# Patient Record
Sex: FEMALE | Race: WHITE | NOT HISPANIC OR LATINO | Employment: OTHER | URBAN - METROPOLITAN AREA
[De-identification: names, ages, dates, MRNs, and addresses within clinical notes are randomized per-mention and may not be internally consistent; named-entity substitution may affect disease eponyms.]

---

## 2018-10-22 ENCOUNTER — APPOINTMENT (OUTPATIENT)
Dept: RADIOLOGY | Facility: MEDICAL CENTER | Age: 83
DRG: 481 | End: 2018-10-22
Attending: EMERGENCY MEDICINE
Payer: MEDICARE

## 2018-10-22 ENCOUNTER — HOSPITAL ENCOUNTER (INPATIENT)
Facility: MEDICAL CENTER | Age: 83
LOS: 7 days | DRG: 481 | End: 2018-10-30
Attending: EMERGENCY MEDICINE | Admitting: INTERNAL MEDICINE
Payer: MEDICARE

## 2018-10-22 DIAGNOSIS — E87.6 HYPOKALEMIA: ICD-10-CM

## 2018-10-22 DIAGNOSIS — S72.142A INTERTROCHANTERIC FRACTURE OF LEFT FEMUR, CLOSED, INITIAL ENCOUNTER (HCC): ICD-10-CM
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
## 2018-10-22 DIAGNOSIS — R00.1 BRADYCARDIA: ICD-10-CM

## 2018-10-22 DIAGNOSIS — S72.142A CLOSED DISPLACED INTERTROCHANTERIC FRACTURE OF LEFT FEMUR, INITIAL ENCOUNTER (HCC): ICD-10-CM

## 2018-10-22 LAB
ALBUMIN SERPL BCP-MCNC: 3.6 G/DL (ref 3.2–4.9)
ALBUMIN/GLOB SERPL: 1.6 G/DL
ALP SERPL-CCNC: 57 U/L (ref 30–99)
ALT SERPL-CCNC: 14 U/L (ref 2–50)
ANION GAP SERPL CALC-SCNC: 7 MMOL/L (ref 0–11.9)
AST SERPL-CCNC: 15 U/L (ref 12–45)
BILIRUB SERPL-MCNC: 0.8 MG/DL (ref 0.1–1.5)
BUN SERPL-MCNC: 16 MG/DL (ref 8–22)
CALCIUM SERPL-MCNC: 9.2 MG/DL (ref 8.5–10.5)
CHLORIDE SERPL-SCNC: 103 MMOL/L (ref 96–112)
CO2 SERPL-SCNC: 33 MMOL/L (ref 20–33)
CREAT SERPL-MCNC: 0.82 MG/DL (ref 0.5–1.4)
ERYTHROCYTE [DISTWIDTH] IN BLOOD BY AUTOMATED COUNT: 44.2 FL (ref 35.9–50)
GLOBULIN SER CALC-MCNC: 2.2 G/DL (ref 1.9–3.5)
GLUCOSE SERPL-MCNC: 96 MG/DL (ref 65–99)
HCT VFR BLD AUTO: 35.4 % (ref 37–47)
HGB BLD-MCNC: 12 G/DL (ref 12–16)
MCH RBC QN AUTO: 31.9 PG (ref 27–33)
MCHC RBC AUTO-ENTMCNC: 33.9 G/DL (ref 33.6–35)
MCV RBC AUTO: 94.1 FL (ref 81.4–97.8)
PLATELET # BLD AUTO: 264 K/UL (ref 164–446)
PMV BLD AUTO: 10.2 FL (ref 9–12.9)
POTASSIUM SERPL-SCNC: 2.8 MMOL/L (ref 3.6–5.5)
PROT SERPL-MCNC: 5.8 G/DL (ref 6–8.2)
RBC # BLD AUTO: 3.76 M/UL (ref 4.2–5.4)
SODIUM SERPL-SCNC: 143 MMOL/L (ref 135–145)
TROPONIN I SERPL-MCNC: <0.01 NG/ML (ref 0–0.04)
WBC # BLD AUTO: 8.5 K/UL (ref 4.8–10.8)

## 2018-10-22 PROCEDURE — 73552 X-RAY EXAM OF FEMUR 2/>: CPT | Mod: LT

## 2018-10-22 PROCEDURE — 72125 CT NECK SPINE W/O DYE: CPT

## 2018-10-22 PROCEDURE — 93005 ELECTROCARDIOGRAM TRACING: CPT | Performed by: EMERGENCY MEDICINE

## 2018-10-22 PROCEDURE — 700111 HCHG RX REV CODE 636 W/ 250 OVERRIDE (IP): Performed by: EMERGENCY MEDICINE

## 2018-10-22 PROCEDURE — 306637 HCHG MISC ORTHO ITEM RC 0274

## 2018-10-22 PROCEDURE — 96375 TX/PRO/DX INJ NEW DRUG ADDON: CPT

## 2018-10-22 PROCEDURE — 64447 NJX AA&/STRD FEMORAL NRV IMG: CPT

## 2018-10-22 PROCEDURE — 85027 COMPLETE CBC AUTOMATED: CPT

## 2018-10-22 PROCEDURE — 96368 THER/DIAG CONCURRENT INF: CPT

## 2018-10-22 PROCEDURE — 73501 X-RAY EXAM HIP UNI 1 VIEW: CPT | Mod: LT

## 2018-10-22 PROCEDURE — 3E0T3BZ INTRODUCTION OF ANESTHETIC AGENT INTO PERIPHERAL NERVES AND PLEXI, PERCUTANEOUS APPROACH: ICD-10-PCS | Performed by: EMERGENCY MEDICINE

## 2018-10-22 PROCEDURE — 96376 TX/PRO/DX INJ SAME DRUG ADON: CPT

## 2018-10-22 PROCEDURE — 72131 CT LUMBAR SPINE W/O DYE: CPT

## 2018-10-22 PROCEDURE — 96365 THER/PROPH/DIAG IV INF INIT: CPT

## 2018-10-22 PROCEDURE — 70450 CT HEAD/BRAIN W/O DYE: CPT

## 2018-10-22 PROCEDURE — 84484 ASSAY OF TROPONIN QUANT: CPT

## 2018-10-22 PROCEDURE — 700105 HCHG RX REV CODE 258: Performed by: EMERGENCY MEDICINE

## 2018-10-22 PROCEDURE — 80053 COMPREHEN METABOLIC PANEL: CPT

## 2018-10-22 PROCEDURE — 99291 CRITICAL CARE FIRST HOUR: CPT

## 2018-10-22 RX ORDER — MORPHINE SULFATE 10 MG/ML
5 INJECTION, SOLUTION INTRAMUSCULAR; INTRAVENOUS ONCE
Status: COMPLETED | OUTPATIENT
Start: 2018-10-22 | End: 2018-10-22

## 2018-10-22 RX ORDER — MAGNESIUM SULFATE HEPTAHYDRATE 40 MG/ML
2 INJECTION, SOLUTION INTRAVENOUS ONCE
Status: COMPLETED | OUTPATIENT
Start: 2018-10-22 | End: 2018-10-23

## 2018-10-22 RX ORDER — ONDANSETRON 2 MG/ML
4 INJECTION INTRAMUSCULAR; INTRAVENOUS ONCE
Status: COMPLETED | OUTPATIENT
Start: 2018-10-22 | End: 2018-10-22

## 2018-10-22 RX ORDER — POTASSIUM CHLORIDE 7.45 MG/ML
10 INJECTION INTRAVENOUS ONCE
Status: COMPLETED | OUTPATIENT
Start: 2018-10-22 | End: 2018-10-22

## 2018-10-22 RX ORDER — POTASSIUM CHLORIDE 20 MEQ/1
40 TABLET, EXTENDED RELEASE ORAL ONCE
Status: COMPLETED | OUTPATIENT
Start: 2018-10-22 | End: 2018-10-23

## 2018-10-22 RX ORDER — SODIUM CHLORIDE 9 MG/ML
1000 INJECTION, SOLUTION INTRAVENOUS ONCE
Status: COMPLETED | OUTPATIENT
Start: 2018-10-22 | End: 2018-10-23

## 2018-10-22 RX ORDER — MORPHINE SULFATE 10 MG/ML
5 INJECTION, SOLUTION INTRAMUSCULAR; INTRAVENOUS
Status: COMPLETED | OUTPATIENT
Start: 2018-10-22 | End: 2018-10-22

## 2018-10-22 RX ADMIN — ONDANSETRON 4 MG: 2 INJECTION INTRAMUSCULAR; INTRAVENOUS at 21:02

## 2018-10-22 RX ADMIN — POTASSIUM CHLORIDE 10 MEQ: 7.46 INJECTION, SOLUTION INTRAVENOUS at 22:58

## 2018-10-22 RX ADMIN — MORPHINE SULFATE 5 MG: 10 INJECTION INTRAVENOUS at 21:02

## 2018-10-22 RX ADMIN — MORPHINE SULFATE 5 MG: 10 INJECTION INTRAVENOUS at 22:17

## 2018-10-22 RX ADMIN — SODIUM CHLORIDE 1000 ML: 9 INJECTION, SOLUTION INTRAVENOUS at 23:31

## 2018-10-22 RX ADMIN — MAGNESIUM SULFATE HEPTAHYDRATE 2 G: 40 INJECTION, SOLUTION INTRAVENOUS at 22:58

## 2018-10-22 ASSESSMENT — ENCOUNTER SYMPTOMS
POLYDIPSIA: 0
BLURRED VISION: 0
ABDOMINAL PAIN: 0
NAUSEA: 0
SORE THROAT: 0
TINGLING: 0
FOCAL WEAKNESS: 0
PHOTOPHOBIA: 0
NECK PAIN: 0
COUGH: 0
BLOOD IN STOOL: 0
FEVER: 0
DEPRESSION: 1
CONSTIPATION: 0
HEMOPTYSIS: 0
DIAPHORESIS: 0
SEIZURES: 0
DIZZINESS: 0
VOMITING: 0
WEAKNESS: 0
MEMORY LOSS: 1
WEIGHT LOSS: 0
CHILLS: 0
HEADACHES: 1
SHORTNESS OF BREATH: 0
BRUISES/BLEEDS EASILY: 0
BACK PAIN: 1
DIARRHEA: 0

## 2018-10-22 ASSESSMENT — PAIN SCALES - GENERAL
PAINLEVEL_OUTOF10: 5
PAINLEVEL_OUTOF10: 10
PAINLEVEL_OUTOF10: 5

## 2018-10-22 NOTE — LETTER
Emergency Services     October 23, 2018    Patient: Madhavi Montiel   YOB: 1935   Date of Visit: 10/22/2018       To Whom It May Concern:    Madhavi Montiel was seen and treated in our emergency department on   10/22/2018. Pt's son, Mayito was also with her. Please excuse him from work on 10/22/18-10/23/18.     Sincerely,       [facility], EMERGENCY DEPT  Dept: [phone]

## 2018-10-23 ENCOUNTER — APPOINTMENT (OUTPATIENT)
Dept: RADIOLOGY | Facility: MEDICAL CENTER | Age: 83
DRG: 481 | End: 2018-10-23
Attending: INTERNAL MEDICINE
Payer: MEDICARE

## 2018-10-23 ENCOUNTER — APPOINTMENT (OUTPATIENT)
Dept: RADIOLOGY | Facility: MEDICAL CENTER | Age: 83
DRG: 481 | End: 2018-10-23
Attending: ORTHOPAEDIC SURGERY
Payer: MEDICARE

## 2018-10-23 PROBLEM — E87.6 HYPOKALEMIA: Status: ACTIVE | Noted: 2018-10-23

## 2018-10-23 PROBLEM — D64.9 ANEMIA: Status: ACTIVE | Noted: 2018-10-23

## 2018-10-23 PROBLEM — I10 ESSENTIAL HYPERTENSION, BENIGN: Status: ACTIVE | Noted: 2018-10-23

## 2018-10-23 PROBLEM — S72.002A HIP FRACTURE, LEFT (HCC): Status: ACTIVE | Noted: 2018-10-23

## 2018-10-23 PROBLEM — E78.5 DYSLIPIDEMIA: Status: ACTIVE | Noted: 2018-10-23

## 2018-10-23 PROBLEM — I35.8 AORTIC SYSTOLIC MURMUR ON EXAMINATION: Status: ACTIVE | Noted: 2018-10-23

## 2018-10-23 PROBLEM — S72.142A INTERTROCHANTERIC FRACTURE OF LEFT FEMUR, CLOSED, INITIAL ENCOUNTER (HCC): Status: ACTIVE | Noted: 2018-10-23

## 2018-10-23 PROBLEM — G89.4 CHRONIC PAIN SYNDROME: Status: ACTIVE | Noted: 2018-10-23

## 2018-10-23 PROBLEM — E78.5 HYPERLIPIDEMIA: Chronic | Status: ACTIVE | Noted: 2018-10-23

## 2018-10-23 LAB
ABO GROUP BLD: NORMAL
ABO GROUP BLD: NORMAL
ALBUMIN SERPL BCP-MCNC: 3.3 G/DL (ref 3.2–4.9)
ALBUMIN/GLOB SERPL: 1.7 G/DL
ALP SERPL-CCNC: 47 U/L (ref 30–99)
ALT SERPL-CCNC: 13 U/L (ref 2–50)
AMPHET UR QL SCN: NEGATIVE
ANION GAP SERPL CALC-SCNC: 8 MMOL/L (ref 0–11.9)
ANION GAP SERPL CALC-SCNC: 8 MMOL/L (ref 0–11.9)
AST SERPL-CCNC: 13 U/L (ref 12–45)
BARBITURATES UR QL SCN: NEGATIVE
BASOPHILS # BLD AUTO: 0.6 % (ref 0–1.8)
BASOPHILS # BLD AUTO: 0.7 % (ref 0–1.8)
BASOPHILS # BLD: 0.05 K/UL (ref 0–0.12)
BASOPHILS # BLD: 0.06 K/UL (ref 0–0.12)
BENZODIAZ UR QL SCN: POSITIVE
BILIRUB SERPL-MCNC: 0.9 MG/DL (ref 0.1–1.5)
BLD GP AB SCN SERPL QL: NORMAL
BUN SERPL-MCNC: 14 MG/DL (ref 8–22)
BUN SERPL-MCNC: 16 MG/DL (ref 8–22)
BZE UR QL SCN: NEGATIVE
CALCIUM SERPL-MCNC: 8.3 MG/DL (ref 8.5–10.5)
CALCIUM SERPL-MCNC: 8.8 MG/DL (ref 8.5–10.5)
CANNABINOIDS UR QL SCN: NEGATIVE
CHLORIDE SERPL-SCNC: 105 MMOL/L (ref 96–112)
CHLORIDE SERPL-SCNC: 109 MMOL/L (ref 96–112)
CO2 SERPL-SCNC: 26 MMOL/L (ref 20–33)
CO2 SERPL-SCNC: 27 MMOL/L (ref 20–33)
CREAT SERPL-MCNC: 0.62 MG/DL (ref 0.5–1.4)
CREAT SERPL-MCNC: 0.73 MG/DL (ref 0.5–1.4)
EOSINOPHIL # BLD AUTO: 0.03 K/UL (ref 0–0.51)
EOSINOPHIL # BLD AUTO: 0.08 K/UL (ref 0–0.51)
EOSINOPHIL NFR BLD: 0.4 % (ref 0–6.9)
EOSINOPHIL NFR BLD: 0.9 % (ref 0–6.9)
ERYTHROCYTE [DISTWIDTH] IN BLOOD BY AUTOMATED COUNT: 45.5 FL (ref 35.9–50)
ERYTHROCYTE [DISTWIDTH] IN BLOOD BY AUTOMATED COUNT: 45.7 FL (ref 35.9–50)
ETHANOL BLD-MCNC: 0 G/DL
GLOBULIN SER CALC-MCNC: 2 G/DL (ref 1.9–3.5)
GLUCOSE SERPL-MCNC: 100 MG/DL (ref 65–99)
GLUCOSE SERPL-MCNC: 99 MG/DL (ref 65–99)
HCT VFR BLD AUTO: 30.5 % (ref 37–47)
HCT VFR BLD AUTO: 30.5 % (ref 37–47)
HCT VFR BLD AUTO: 31.5 % (ref 37–47)
HGB BLD-MCNC: 10 G/DL (ref 12–16)
HGB BLD-MCNC: 10 G/DL (ref 12–16)
HGB BLD-MCNC: 10.3 G/DL (ref 12–16)
IMM GRANULOCYTES # BLD AUTO: 0.03 K/UL (ref 0–0.11)
IMM GRANULOCYTES # BLD AUTO: 0.03 K/UL (ref 0–0.11)
IMM GRANULOCYTES NFR BLD AUTO: 0.3 % (ref 0–0.9)
IMM GRANULOCYTES NFR BLD AUTO: 0.4 % (ref 0–0.9)
INR PPP: 1.1 (ref 0.87–1.13)
LYMPHOCYTES # BLD AUTO: 0.98 K/UL (ref 1–4.8)
LYMPHOCYTES # BLD AUTO: 1.39 K/UL (ref 1–4.8)
LYMPHOCYTES NFR BLD: 11.9 % (ref 22–41)
LYMPHOCYTES NFR BLD: 15.1 % (ref 22–41)
MAGNESIUM SERPL-MCNC: 2.1 MG/DL (ref 1.5–2.5)
MCH RBC QN AUTO: 31.3 PG (ref 27–33)
MCH RBC QN AUTO: 31.4 PG (ref 27–33)
MCHC RBC AUTO-ENTMCNC: 32.6 G/DL (ref 33.6–35)
MCHC RBC AUTO-ENTMCNC: 32.7 G/DL (ref 33.6–35)
MCV RBC AUTO: 96 FL (ref 81.4–97.8)
MCV RBC AUTO: 96.2 FL (ref 81.4–97.8)
METHADONE UR QL SCN: NEGATIVE
MONOCYTES # BLD AUTO: 0.62 K/UL (ref 0–0.85)
MONOCYTES # BLD AUTO: 0.62 K/UL (ref 0–0.85)
MONOCYTES NFR BLD AUTO: 6.7 % (ref 0–13.4)
MONOCYTES NFR BLD AUTO: 7.5 % (ref 0–13.4)
NEUTROPHILS # BLD AUTO: 6.53 K/UL (ref 2–7.15)
NEUTROPHILS # BLD AUTO: 7.01 K/UL (ref 2–7.15)
NEUTROPHILS NFR BLD: 76.3 % (ref 44–72)
NEUTROPHILS NFR BLD: 79.2 % (ref 44–72)
NRBC # BLD AUTO: 0 K/UL
NRBC # BLD AUTO: 0 K/UL
NRBC BLD-RTO: 0 /100 WBC
NRBC BLD-RTO: 0 /100 WBC
OPIATES UR QL SCN: POSITIVE
OXYCODONE UR QL SCN: NEGATIVE
PCP UR QL SCN: NEGATIVE
PLATELET # BLD AUTO: 214 K/UL (ref 164–446)
PLATELET # BLD AUTO: 223 K/UL (ref 164–446)
PMV BLD AUTO: 10 FL (ref 9–12.9)
PMV BLD AUTO: 10.1 FL (ref 9–12.9)
POTASSIUM SERPL-SCNC: 3 MMOL/L (ref 3.6–5.5)
POTASSIUM SERPL-SCNC: 3.3 MMOL/L (ref 3.6–5.5)
POTASSIUM SERPL-SCNC: 3.6 MMOL/L (ref 3.6–5.5)
PROPOXYPH UR QL SCN: NEGATIVE
PROT SERPL-MCNC: 5.3 G/DL (ref 6–8.2)
PROTHROMBIN TIME: 14.3 SEC (ref 12–14.6)
RBC # BLD AUTO: 3.16 M/UL (ref 4.2–5.4)
RBC # BLD AUTO: 3.28 M/UL (ref 4.2–5.4)
RH BLD: NORMAL
RH BLD: NORMAL
SODIUM SERPL-SCNC: 140 MMOL/L (ref 135–145)
SODIUM SERPL-SCNC: 143 MMOL/L (ref 135–145)
WBC # BLD AUTO: 8.2 K/UL (ref 4.8–10.8)
WBC # BLD AUTO: 9.2 K/UL (ref 4.8–10.8)

## 2018-10-23 PROCEDURE — 86850 RBC ANTIBODY SCREEN: CPT

## 2018-10-23 PROCEDURE — 160009 HCHG ANES TIME/MIN: Performed by: ORTHOPAEDIC SURGERY

## 2018-10-23 PROCEDURE — 700102 HCHG RX REV CODE 250 W/ 637 OVERRIDE(OP): Performed by: ANESTHESIOLOGY

## 2018-10-23 PROCEDURE — 500891 HCHG PACK, ORTHO MAJOR: Performed by: ORTHOPAEDIC SURGERY

## 2018-10-23 PROCEDURE — 85610 PROTHROMBIN TIME: CPT

## 2018-10-23 PROCEDURE — 770020 HCHG ROOM/CARE - TELE (206)

## 2018-10-23 PROCEDURE — 700101 HCHG RX REV CODE 250: Mod: JW

## 2018-10-23 PROCEDURE — 83735 ASSAY OF MAGNESIUM: CPT

## 2018-10-23 PROCEDURE — 80307 DRUG TEST PRSMV CHEM ANLYZR: CPT

## 2018-10-23 PROCEDURE — 86901 BLOOD TYPING SEROLOGIC RH(D): CPT

## 2018-10-23 PROCEDURE — 73502 X-RAY EXAM HIP UNI 2-3 VIEWS: CPT | Mod: LT

## 2018-10-23 PROCEDURE — 96366 THER/PROPH/DIAG IV INF ADDON: CPT

## 2018-10-23 PROCEDURE — 84132 ASSAY OF SERUM POTASSIUM: CPT

## 2018-10-23 PROCEDURE — 71045 X-RAY EXAM CHEST 1 VIEW: CPT

## 2018-10-23 PROCEDURE — 700101 HCHG RX REV CODE 250: Performed by: EMERGENCY MEDICINE

## 2018-10-23 PROCEDURE — 160029 HCHG SURGERY MINUTES - 1ST 30 MINS LEVEL 4: Performed by: ORTHOPAEDIC SURGERY

## 2018-10-23 PROCEDURE — C1713 ANCHOR/SCREW BN/BN,TIS/BN: HCPCS | Performed by: ORTHOPAEDIC SURGERY

## 2018-10-23 PROCEDURE — 85025 COMPLETE CBC W/AUTO DIFF WBC: CPT

## 2018-10-23 PROCEDURE — A9270 NON-COVERED ITEM OR SERVICE: HCPCS | Performed by: EMERGENCY MEDICINE

## 2018-10-23 PROCEDURE — 51702 INSERT TEMP BLADDER CATH: CPT

## 2018-10-23 PROCEDURE — 700111 HCHG RX REV CODE 636 W/ 250 OVERRIDE (IP): Performed by: EMERGENCY MEDICINE

## 2018-10-23 PROCEDURE — 160002 HCHG RECOVERY MINUTES (STAT): Performed by: ORTHOPAEDIC SURGERY

## 2018-10-23 PROCEDURE — 36415 COLL VENOUS BLD VENIPUNCTURE: CPT

## 2018-10-23 PROCEDURE — 99223 1ST HOSP IP/OBS HIGH 75: CPT | Mod: AI,GC | Performed by: INTERNAL MEDICINE

## 2018-10-23 PROCEDURE — 303105 HCHG CATHETER EXTRA

## 2018-10-23 PROCEDURE — 160036 HCHG PACU - EA ADDL 30 MINS PHASE I: Performed by: ORTHOPAEDIC SURGERY

## 2018-10-23 PROCEDURE — 160048 HCHG OR STATISTICAL LEVEL 1-5: Performed by: ORTHOPAEDIC SURGERY

## 2018-10-23 PROCEDURE — 700105 HCHG RX REV CODE 258: Performed by: STUDENT IN AN ORGANIZED HEALTH CARE EDUCATION/TRAINING PROGRAM

## 2018-10-23 PROCEDURE — 700111 HCHG RX REV CODE 636 W/ 250 OVERRIDE (IP): Performed by: STUDENT IN AN ORGANIZED HEALTH CARE EDUCATION/TRAINING PROGRAM

## 2018-10-23 PROCEDURE — 700111 HCHG RX REV CODE 636 W/ 250 OVERRIDE (IP): Performed by: ANESTHESIOLOGY

## 2018-10-23 PROCEDURE — 160041 HCHG SURGERY MINUTES - EA ADDL 1 MIN LEVEL 4: Performed by: ORTHOPAEDIC SURGERY

## 2018-10-23 PROCEDURE — 94760 N-INVAS EAR/PLS OXIMETRY 1: CPT

## 2018-10-23 PROCEDURE — A9270 NON-COVERED ITEM OR SERVICE: HCPCS | Performed by: ANESTHESIOLOGY

## 2018-10-23 PROCEDURE — A9270 NON-COVERED ITEM OR SERVICE: HCPCS | Performed by: STUDENT IN AN ORGANIZED HEALTH CARE EDUCATION/TRAINING PROGRAM

## 2018-10-23 PROCEDURE — 0QS706Z REPOSITION LEFT UPPER FEMUR WITH INTRAMEDULLARY INTERNAL FIXATION DEVICE, OPEN APPROACH: ICD-10-PCS | Performed by: ORTHOPAEDIC SURGERY

## 2018-10-23 PROCEDURE — 110371 HCHG SHELL REV 272: Performed by: ORTHOPAEDIC SURGERY

## 2018-10-23 PROCEDURE — 80048 BASIC METABOLIC PNL TOTAL CA: CPT

## 2018-10-23 PROCEDURE — 96376 TX/PRO/DX INJ SAME DRUG ADON: CPT

## 2018-10-23 PROCEDURE — 501838 HCHG SUTURE GENERAL: Performed by: ORTHOPAEDIC SURGERY

## 2018-10-23 PROCEDURE — 700101 HCHG RX REV CODE 250: Performed by: STUDENT IN AN ORGANIZED HEALTH CARE EDUCATION/TRAINING PROGRAM

## 2018-10-23 PROCEDURE — 700102 HCHG RX REV CODE 250 W/ 637 OVERRIDE(OP): Performed by: STUDENT IN AN ORGANIZED HEALTH CARE EDUCATION/TRAINING PROGRAM

## 2018-10-23 PROCEDURE — 80053 COMPREHEN METABOLIC PANEL: CPT

## 2018-10-23 PROCEDURE — 700102 HCHG RX REV CODE 250 W/ 637 OVERRIDE(OP): Performed by: EMERGENCY MEDICINE

## 2018-10-23 PROCEDURE — 700111 HCHG RX REV CODE 636 W/ 250 OVERRIDE (IP)

## 2018-10-23 PROCEDURE — 160035 HCHG PACU - 1ST 60 MINS PHASE I: Performed by: ORTHOPAEDIC SURGERY

## 2018-10-23 PROCEDURE — 86900 BLOOD TYPING SEROLOGIC ABO: CPT

## 2018-10-23 DEVICE — SCREW CROSS LOCK 5MM X 40MM (4TX5=20): Type: IMPLANTABLE DEVICE | Site: HIP | Status: FUNCTIONAL

## 2018-10-23 DEVICE — SCREW LAG 10.5MM X 95MM (4TX2=8): Type: IMPLANTABLE DEVICE | Site: HIP | Status: FUNCTIONAL

## 2018-10-23 DEVICE — IMPLANTABLE DEVICE: Type: IMPLANTABLE DEVICE | Site: HIP | Status: FUNCTIONAL

## 2018-10-23 RX ORDER — HYDROMORPHONE HYDROCHLORIDE 2 MG/ML
0.1 INJECTION, SOLUTION INTRAMUSCULAR; INTRAVENOUS; SUBCUTANEOUS
Status: DISCONTINUED | OUTPATIENT
Start: 2018-10-23 | End: 2018-10-23 | Stop reason: HOSPADM

## 2018-10-23 RX ORDER — LISINOPRIL 20 MG/1
20 TABLET ORAL DAILY
Status: DISCONTINUED | OUTPATIENT
Start: 2018-10-23 | End: 2018-10-27

## 2018-10-23 RX ORDER — METOPROLOL SUCCINATE 25 MG/1
25 TABLET, EXTENDED RELEASE ORAL DAILY
Status: DISCONTINUED | OUTPATIENT
Start: 2018-10-23 | End: 2018-10-23

## 2018-10-23 RX ORDER — SODIUM CHLORIDE AND POTASSIUM CHLORIDE 300; 900 MG/100ML; MG/100ML
INJECTION, SOLUTION INTRAVENOUS ONCE
Status: COMPLETED | OUTPATIENT
Start: 2018-10-23 | End: 2018-10-23

## 2018-10-23 RX ORDER — LIDOCAINE HYDROCHLORIDE 20 MG/ML
20 INJECTION, SOLUTION INFILTRATION; PERINEURAL ONCE
Status: COMPLETED | OUTPATIENT
Start: 2018-10-23 | End: 2018-10-23

## 2018-10-23 RX ORDER — HALOPERIDOL 5 MG/ML
1 INJECTION INTRAMUSCULAR
Status: DISCONTINUED | OUTPATIENT
Start: 2018-10-23 | End: 2018-10-23 | Stop reason: HOSPADM

## 2018-10-23 RX ORDER — HYDROCODONE BITARTRATE AND ACETAMINOPHEN 10; 325 MG/1; MG/1
1 TABLET ORAL EVERY 6 HOURS PRN
Status: ON HOLD | COMMUNITY
End: 2018-10-30

## 2018-10-23 RX ORDER — METOPROLOL SUCCINATE 25 MG/1
25 TABLET, EXTENDED RELEASE ORAL DAILY
COMMUNITY

## 2018-10-23 RX ORDER — CITALOPRAM 20 MG/1
20 TABLET ORAL DAILY
Status: DISCONTINUED | OUTPATIENT
Start: 2018-10-23 | End: 2018-10-30 | Stop reason: HOSPADM

## 2018-10-23 RX ORDER — DONEPEZIL HYDROCHLORIDE 5 MG/1
5 TABLET, FILM COATED ORAL EVERY EVENING
Status: DISCONTINUED | OUTPATIENT
Start: 2018-10-23 | End: 2018-10-30 | Stop reason: HOSPADM

## 2018-10-23 RX ORDER — BUPROPION HYDROCHLORIDE 150 MG/1
150 TABLET, EXTENDED RELEASE ORAL DAILY
COMMUNITY

## 2018-10-23 RX ORDER — MEPERIDINE HYDROCHLORIDE 25 MG/ML
12.5 INJECTION INTRAMUSCULAR; INTRAVENOUS; SUBCUTANEOUS
Status: DISCONTINUED | OUTPATIENT
Start: 2018-10-23 | End: 2018-10-23 | Stop reason: HOSPADM

## 2018-10-23 RX ORDER — SODIUM CHLORIDE 9 MG/ML
INJECTION, SOLUTION INTRAVENOUS CONTINUOUS
Status: DISCONTINUED | OUTPATIENT
Start: 2018-10-23 | End: 2018-10-26

## 2018-10-23 RX ORDER — MORPHINE SULFATE 4 MG/ML
2 INJECTION, SOLUTION INTRAMUSCULAR; INTRAVENOUS EVERY 4 HOURS PRN
Status: DISCONTINUED | OUTPATIENT
Start: 2018-10-23 | End: 2018-10-23

## 2018-10-23 RX ORDER — OXYCODONE HCL 5 MG/5 ML
5 SOLUTION, ORAL ORAL
Status: COMPLETED | OUTPATIENT
Start: 2018-10-23 | End: 2018-10-23

## 2018-10-23 RX ORDER — POLYETHYLENE GLYCOL 3350 17 G/17G
1 POWDER, FOR SOLUTION ORAL
Status: DISCONTINUED | OUTPATIENT
Start: 2018-10-23 | End: 2018-10-28

## 2018-10-23 RX ORDER — OMEPRAZOLE 20 MG/1
40 CAPSULE, DELAYED RELEASE ORAL DAILY
Status: DISCONTINUED | OUTPATIENT
Start: 2018-10-23 | End: 2018-10-30 | Stop reason: HOSPADM

## 2018-10-23 RX ORDER — DONEPEZIL HYDROCHLORIDE 5 MG/1
5 TABLET, FILM COATED ORAL EVERY EVENING
COMMUNITY

## 2018-10-23 RX ORDER — ONDANSETRON 2 MG/ML
4 INJECTION INTRAMUSCULAR; INTRAVENOUS
Status: DISCONTINUED | OUTPATIENT
Start: 2018-10-23 | End: 2018-10-23 | Stop reason: HOSPADM

## 2018-10-23 RX ORDER — ATORVASTATIN CALCIUM 40 MG/1
40 TABLET, FILM COATED ORAL EVERY EVENING
Status: DISCONTINUED | OUTPATIENT
Start: 2018-10-23 | End: 2018-10-30 | Stop reason: HOSPADM

## 2018-10-23 RX ORDER — HYDROMORPHONE HYDROCHLORIDE 2 MG/ML
0.2 INJECTION, SOLUTION INTRAMUSCULAR; INTRAVENOUS; SUBCUTANEOUS
Status: DISCONTINUED | OUTPATIENT
Start: 2018-10-23 | End: 2018-10-23 | Stop reason: HOSPADM

## 2018-10-23 RX ORDER — HYDROCODONE BITARTRATE AND ACETAMINOPHEN 10; 325 MG/1; MG/1
1 TABLET ORAL EVERY 6 HOURS PRN
Status: DISCONTINUED | OUTPATIENT
Start: 2018-10-23 | End: 2018-10-27

## 2018-10-23 RX ORDER — CEFAZOLIN SODIUM 1 G/50ML
1 INJECTION, SOLUTION INTRAVENOUS EVERY 8 HOURS
Status: COMPLETED | OUTPATIENT
Start: 2018-10-24 | End: 2018-10-24

## 2018-10-23 RX ORDER — HYDROMORPHONE HYDROCHLORIDE 2 MG/ML
0.4 INJECTION, SOLUTION INTRAMUSCULAR; INTRAVENOUS; SUBCUTANEOUS
Status: DISCONTINUED | OUTPATIENT
Start: 2018-10-23 | End: 2018-10-23 | Stop reason: HOSPADM

## 2018-10-23 RX ORDER — AMOXICILLIN 250 MG
2 CAPSULE ORAL 2 TIMES DAILY
Status: DISCONTINUED | OUTPATIENT
Start: 2018-10-23 | End: 2018-10-28

## 2018-10-23 RX ORDER — OXYCODONE HCL 5 MG/5 ML
10 SOLUTION, ORAL ORAL
Status: COMPLETED | OUTPATIENT
Start: 2018-10-23 | End: 2018-10-23

## 2018-10-23 RX ORDER — BUPIVACAINE HYDROCHLORIDE 2.5 MG/ML
10 INJECTION, SOLUTION EPIDURAL; INFILTRATION; INTRACAUDAL ONCE
Status: COMPLETED | OUTPATIENT
Start: 2018-10-23 | End: 2018-10-23

## 2018-10-23 RX ORDER — OMEPRAZOLE 40 MG/1
40 CAPSULE, DELAYED RELEASE ORAL DAILY
COMMUNITY

## 2018-10-23 RX ORDER — BUPROPION HYDROCHLORIDE 150 MG/1
150 TABLET, EXTENDED RELEASE ORAL DAILY
Status: DISCONTINUED | OUTPATIENT
Start: 2018-10-23 | End: 2018-10-25

## 2018-10-23 RX ORDER — TRAZODONE HYDROCHLORIDE 50 MG/1
50 TABLET ORAL
COMMUNITY

## 2018-10-23 RX ORDER — SODIUM CHLORIDE, SODIUM LACTATE, POTASSIUM CHLORIDE, CALCIUM CHLORIDE 600; 310; 30; 20 MG/100ML; MG/100ML; MG/100ML; MG/100ML
INJECTION, SOLUTION INTRAVENOUS CONTINUOUS
Status: DISCONTINUED | OUTPATIENT
Start: 2018-10-23 | End: 2018-10-23 | Stop reason: HOSPADM

## 2018-10-23 RX ORDER — ATORVASTATIN CALCIUM 40 MG/1
40 TABLET, FILM COATED ORAL EVERY EVENING
COMMUNITY

## 2018-10-23 RX ORDER — OXYCODONE HYDROCHLORIDE 5 MG/1
10 TABLET ORAL
Status: DISCONTINUED | OUTPATIENT
Start: 2018-10-23 | End: 2018-10-23 | Stop reason: HOSPADM

## 2018-10-23 RX ORDER — OXYCODONE HYDROCHLORIDE 5 MG/1
5 TABLET ORAL
Status: DISCONTINUED | OUTPATIENT
Start: 2018-10-23 | End: 2018-10-23 | Stop reason: HOSPADM

## 2018-10-23 RX ORDER — CITALOPRAM 20 MG/1
20 TABLET ORAL DAILY
COMMUNITY

## 2018-10-23 RX ORDER — DIPHENHYDRAMINE HYDROCHLORIDE 50 MG/ML
12.5 INJECTION INTRAMUSCULAR; INTRAVENOUS
Status: DISCONTINUED | OUTPATIENT
Start: 2018-10-23 | End: 2018-10-23 | Stop reason: HOSPADM

## 2018-10-23 RX ORDER — BISACODYL 10 MG
10 SUPPOSITORY, RECTAL RECTAL
Status: DISCONTINUED | OUTPATIENT
Start: 2018-10-23 | End: 2018-10-28

## 2018-10-23 RX ORDER — MORPHINE SULFATE 10 MG/ML
5 INJECTION, SOLUTION INTRAMUSCULAR; INTRAVENOUS EVERY 4 HOURS PRN
Status: DISCONTINUED | OUTPATIENT
Start: 2018-10-23 | End: 2018-10-27

## 2018-10-23 RX ORDER — MORPHINE SULFATE 10 MG/ML
5 INJECTION, SOLUTION INTRAMUSCULAR; INTRAVENOUS
Status: DISCONTINUED | OUTPATIENT
Start: 2018-10-23 | End: 2018-10-23

## 2018-10-23 RX ORDER — LISINOPRIL 20 MG/1
20 TABLET ORAL DAILY
Status: ON HOLD | COMMUNITY
End: 2018-10-30

## 2018-10-23 RX ADMIN — SODIUM CHLORIDE: 9 INJECTION, SOLUTION INTRAVENOUS at 18:49

## 2018-10-23 RX ADMIN — HYDROMORPHONE HYDROCHLORIDE 0.4 MG: 2 INJECTION INTRAMUSCULAR; INTRAVENOUS; SUBCUTANEOUS at 17:26

## 2018-10-23 RX ADMIN — FENTANYL CITRATE 50 MCG: 50 INJECTION, SOLUTION INTRAMUSCULAR; INTRAVENOUS at 16:39

## 2018-10-23 RX ADMIN — HYDROMORPHONE HYDROCHLORIDE 0.4 MG: 2 INJECTION INTRAMUSCULAR; INTRAVENOUS; SUBCUTANEOUS at 17:11

## 2018-10-23 RX ADMIN — MORPHINE SULFATE 5 MG: 10 INJECTION INTRAVENOUS at 09:15

## 2018-10-23 RX ADMIN — BUPIVACAINE HYDROCHLORIDE 10 ML: 2.5 INJECTION, SOLUTION EPIDURAL; INFILTRATION; INTRACAUDAL; PERINEURAL at 04:24

## 2018-10-23 RX ADMIN — POTASSIUM CHLORIDE AND SODIUM CHLORIDE: 900; 300 INJECTION, SOLUTION INTRAVENOUS at 03:52

## 2018-10-23 RX ADMIN — MORPHINE SULFATE 5 MG: 10 INJECTION INTRAVENOUS at 19:57

## 2018-10-23 RX ADMIN — ATORVASTATIN CALCIUM 40 MG: 40 TABLET, FILM COATED ORAL at 19:50

## 2018-10-23 RX ADMIN — HYDROMORPHONE HYDROCHLORIDE 0.4 MG: 2 INJECTION INTRAMUSCULAR; INTRAVENOUS; SUBCUTANEOUS at 17:03

## 2018-10-23 RX ADMIN — MORPHINE SULFATE 5 MG: 10 INJECTION INTRAVENOUS at 14:10

## 2018-10-23 RX ADMIN — MORPHINE SULFATE 2 MG: 4 INJECTION INTRAVENOUS at 07:45

## 2018-10-23 RX ADMIN — HYDROCODONE BITARTRATE AND ACETAMINOPHEN 1 TABLET: 10; 325 TABLET ORAL at 19:46

## 2018-10-23 RX ADMIN — SENNOSIDES AND DOCUSATE SODIUM 2 TABLET: 8.6; 5 TABLET ORAL at 19:50

## 2018-10-23 RX ADMIN — FENTANYL CITRATE 50 MCG: 50 INJECTION, SOLUTION INTRAMUSCULAR; INTRAVENOUS at 16:53

## 2018-10-23 RX ADMIN — POTASSIUM CHLORIDE 40 MEQ: 1500 TABLET, EXTENDED RELEASE ORAL at 00:07

## 2018-10-23 RX ADMIN — HYDROCODONE BITARTRATE AND ACETAMINOPHEN 1 TABLET: 10; 325 TABLET ORAL at 04:02

## 2018-10-23 RX ADMIN — OXYCODONE HYDROCHLORIDE 10 MG: 5 SOLUTION ORAL at 16:51

## 2018-10-23 RX ADMIN — LIDOCAINE HYDROCHLORIDE 20 ML: 20 INJECTION, SOLUTION INFILTRATION; PERINEURAL at 04:23

## 2018-10-23 RX ADMIN — HYDROMORPHONE HYDROCHLORIDE 0.4 MG: 2 INJECTION INTRAMUSCULAR; INTRAVENOUS; SUBCUTANEOUS at 17:54

## 2018-10-23 RX ADMIN — DONEPEZIL HYDROCHLORIDE 5 MG: 5 TABLET, FILM COATED ORAL at 19:50

## 2018-10-23 ASSESSMENT — COGNITIVE AND FUNCTIONAL STATUS - GENERAL
CLIMB 3 TO 5 STEPS WITH RAILING: TOTAL
MOBILITY SCORE: 6
MOVING FROM LYING ON BACK TO SITTING ON SIDE OF FLAT BED: UNABLE
STANDING UP FROM CHAIR USING ARMS: TOTAL
SUGGESTED CMS G CODE MODIFIER MOBILITY: CN
MOVING TO AND FROM BED TO CHAIR: UNABLE
TURNING FROM BACK TO SIDE WHILE IN FLAT BAD: UNABLE
DAILY ACTIVITIY SCORE: 10
SUGGESTED CMS G CODE MODIFIER DAILY ACTIVITY: CL
PERSONAL GROOMING: A LITTLE
WALKING IN HOSPITAL ROOM: TOTAL
HELP NEEDED FOR BATHING: TOTAL
EATING MEALS: A LITTLE
TOILETING: TOTAL
DRESSING REGULAR UPPER BODY CLOTHING: TOTAL
DRESSING REGULAR LOWER BODY CLOTHING: TOTAL

## 2018-10-23 ASSESSMENT — PATIENT HEALTH QUESTIONNAIRE - PHQ9
SUM OF ALL RESPONSES TO PHQ9 QUESTIONS 1 AND 2: 0
1. LITTLE INTEREST OR PLEASURE IN DOING THINGS: NOT AT ALL
2. FEELING DOWN, DEPRESSED, IRRITABLE, OR HOPELESS: NOT AT ALL

## 2018-10-23 ASSESSMENT — PAIN SCALES - GENERAL
PAINLEVEL_OUTOF10: 5
PAINLEVEL_OUTOF10: 10
PAINLEVEL_OUTOF10: 4
PAINLEVEL_OUTOF10: 6
PAINLEVEL_OUTOF10: 5
PAINLEVEL_OUTOF10: 9
PAINLEVEL_OUTOF10: 8
PAINLEVEL_OUTOF10: 4
PAINLEVEL_OUTOF10: 10
PAINLEVEL_OUTOF10: 4
PAINLEVEL_OUTOF10: 8
PAINLEVEL_OUTOF10: 8
PAINLEVEL_OUTOF10: 7
PAINLEVEL_OUTOF10: 8
PAINLEVEL_OUTOF10: 10

## 2018-10-23 ASSESSMENT — LIFESTYLE VARIABLES: DO YOU DRINK ALCOHOL: NO

## 2018-10-23 ASSESSMENT — ENCOUNTER SYMPTOMS: LOSS OF CONSCIOUSNESS: 1

## 2018-10-23 NOTE — CONSULTS
"10/23/2018    Madhavi Montiel is a 83 y.o. female who presents after a fall with a left hip fracture and is here for operative management. Patient denies numbness, parasthesias, loss of concousness or other trauma    Past Medical History:   Diagnosis Date   • Back pain    • Dementia    • High cholesterol    • Hypertension        History reviewed. No pertinent surgical history.    Medications  No current facility-administered medications on file prior to encounter.      No current outpatient prescriptions on file prior to encounter.       Allergies  Patient has no known allergies.    ROS  Left hip pain. All other systems were reviewed and found to be negative    Family History   Problem Relation Age of Onset   • No Known Problems Mother    • No Known Problems Father        Social History     Social History   • Marital status:      Spouse name: N/A   • Number of children: N/A   • Years of education: N/A     Social History Main Topics   • Smoking status: Former Smoker     Packs/day: 1.00     Years: 60.00     Types: Cigarettes   • Smokeless tobacco: Never Used      Comment: reports approx. 1 ppd since around age 20 years   • Alcohol use Yes      Comment: reports drinking a glass of wine almost every day   • Drug use: No   • Sexual activity: Not on file     Other Topics Concern   • Not on file     Social History Narrative    Reports living by herself in a house in [place]. Says she has a son who visits her regularly, notes having a good relationship with her son but mentions several times that she dislikes her son's girlfriend. Reports having a cat at home.       Physical Exam  Vitals  Blood pressure 142/56, pulse (!) 52, temperature 36.6 °C (97.9 °F), resp. rate 16, height 1.575 m (5' 2\"), weight 51.3 kg (113 lb), SpO2 94 %.  General: Well Developed, Well Nourished, no acute distress  HEENT: Normocephalic, atraumatic  Eyes: Anicteric, PERRLA, EOMI  Neck: Supple, nontender, no masses  Lungs: CTA, no wheezes or " crackles  Heart: RRR, no murmurs, rubs or gallops  Abdomen: Soft, NT, ND  Pelvis: Stable to AP and Lateral Compression  Skin: Intact, no open wounds  Extremities: Left hip pain and deformity  Neuro: NVI  Vascular: 2+DP/PT, Capillary refill <2 seconds    Radiographs:  DX-FEMUR-2+ LEFT   Final Result      Mildly displaced and likely comminuted LEFT proximal femur intertrochanteric fracture.      DX-HIP-UNILATERAL-WITH PELVIS-1 VIEW LEFT   Final Result      1.  LEFT proximal femur intertrochanteric fracture, mildly displaced and likely comminuted.   2.  Moderate degenerative change of both hips.   3.  No pelvic fracture.      CT-LSPINE W/O PLUS RECONS   Final Result      1.  Moderate to severe multilevel degenerative change of lumbar spine with levoconvex curvature.   2.  No acute lumbar spine fracture or subluxation.      CT-CSPINE WITHOUT PLUS RECONS   Final Result      1.  Multilevel degenerative change of cervical spine with associated minimal retrolisthesis at C3-4, C4-5 and C6-7.   2.  No acute fracture.      CT-HEAD W/O   Final Result      1.  Diffuse atrophy and white matter changes.   2.  No acute intracranial hemorrhage or territorial infarct.         EC-ECHOCARDIOGRAM COMPLETE W/ CONT    (Results Pending)       Laboratory Values  Recent Labs      10/22/18   2044  10/23/18   0226   WBC  8.5  9.2   RBC  3.76*  3.16*   HEMOGLOBIN  12.0  10.0*  10.0*   HEMATOCRIT  35.4*  30.5*  30.5*   MCV  94.1  96.2   MCH  31.9  31.3   MCHC  33.9  32.6*   RDW  44.2  45.7   PLATELETCT  264  223   MPV  10.2  10.1     Recent Labs      10/22/18   2044  10/23/18   0138   SODIUM  143   --    POTASSIUM  2.8*  3.0*   CHLORIDE  103   --    CO2  33   --    GLUCOSE  96   --    BUN  16   --              Impression: Left Intertrochanteric femur fracture    Plan:Operative intervention. Risks and benefits of surgery were discussed which include but are not limited to bleeding, infection, neurovascular damage, malunion, nonunion,  instability, limb length discrepancy, DVT, PE, MI, Stroke and death. They understand these risks and wish to proceed.

## 2018-10-23 NOTE — ED NOTES
Assumed care of pt, with float pool RN.  Pt currently sleeping, vitals stable.  Awaiting bed assignment.

## 2018-10-23 NOTE — ED NOTES
Pt updated with plan of care, medicated per mar. Pt in nad. Pt denies any needs. Pt still c/o L hip and back pain. Pt appears more comfortable at this time. No needs needs. Will continue to monitor.

## 2018-10-23 NOTE — ED PROVIDER NOTES
ED Provider Note    Scribed for Wesly Mathew M.D. by Omar Duenas. 10/22/2018  8:46 PM    Primary care provider: No primary care provider noted  Means of arrival: Med flight  History obtained from: Patient  History limited by: None    CHIEF COMPLAINT  Chief Complaint   Patient presents with   • GLF   • Hip Pain       HPI  Madhavi Montiel is a 83 y.o. female with a history of dementia and back pain who presents to the Emergency Department for evaluation status post a ground level fall that occurred just prior to arrival. Per patient, tonight while she was at home she was trying not to trip over her dog, slipped, and had a ground level fall. She explains that since this fall she has been having left lower back pain and left hip pain. The patient denies any other trauma including head trauma. She also does not believe she lost consciousness but she admits to having a poor memory secondary to dementia. Patient denies any knee pain. She also reports that she is unsure if she was light-headed prior to her fall.     REVIEW OF SYSTEMS  Pertinent positives include: ground level fall, left lower back pain, left hip pain, possible light-headedness. Pertinent negatives include: head trauma, loss of consciousness, knee pain. See history of present illness. All other systems are negative.     PAST MEDICAL HISTORY   has a past medical history of Back pain; Dementia; High cholesterol; and Hypertension.    SURGICAL HISTORY  patient denies any surgical history    SOCIAL HISTORY  Social History   Substance Use Topics   • Smoking status: Former Smoker   • Smokeless tobacco: Never Used   • Alcohol use No      History   Drug Use No       FAMILY HISTORY  History reviewed. No pertinent family history.    CURRENT MEDICATIONS  Home Medications     Reviewed by Donna Berg (Pharmacy Tech) on 10/23/18 at 0005  Med List Status: Complete   Medication Last Dose Status   atorvastatin (LIPITOR) 40 MG Tab 10/21/2018 Active   buPROPion SR  "(WELLBUTRIN-SR) 150 MG TABLET SR 12 HR sustained-release tablet 10/22/2018 Active   citalopram (CELEXA) 20 MG Tab 10/22/2018 Active   donepezil (ARICEPT) 5 MG Tab 10/21/2018 Active   HYDROcodone/acetaminophen (NORCO)  MG Tab 10/22/2018 Active   lisinopril (PRINIVIL) 20 MG Tab 10/22/2018 Active   metoprolol SR (TOPROL XL) 25 MG TABLET SR 24 HR 10/22/2018 Active   omeprazole (PRILOSEC) 40 MG delayed-release capsule 10/22/2018 Active   traZODone (DESYREL) 50 MG Tab 10/21/2018 Active                ALLERGIES  No Known Allergies    PHYSICAL EXAM  VITAL SIGNS: /56   Pulse 62   Temp 36.6 °C (97.9 °F)   Resp 15   Ht 1.575 m (5' 2\")   Wt 51.3 kg (113 lb)   SpO2 100%   BMI 20.67 kg/m²     Constitutional: Alert in no apparent distress.  HENT: No signs of trauma, Bilateral external ears normal, Nose normal. Uvula midline.   Eyes: Pupils are equal and reactive, Conjunctiva normal, Non-icteric.   Neck: Normal range of motion, No tenderness, Supple, No stridor.   Lymphatic: No lymphadenopathy noted.   Cardiovascular: Regular rate and rhythm, no murmurs.   Thorax & Lungs: Normal breath sounds, No respiratory distress, No wheezing, No chest tenderness.   Abdomen: Bowel sounds normal, Soft, No tenderness, No peritoneal signs, No masses, No pulsatile masses.   Skin: Warm, Dry, No erythema, No rash.   Back: Tenderness to palpation over L-spine. No C- or T-spine tenderness   Extremities: Intact distal pulses, No edema, No cyanosis.  Musculoskeletal: Left hip externally radiated and shortened. Tenderness to palpation of left hip    Neurologic: Alert , Normal motor function, Normal sensory function, No focal deficits noted.   Psychiatric: Affect normal, Judgment normal, Mood normal.    DIAGNOSTIC STUDIES / PROCEDURES    LABS  Labs Reviewed   CBC WITHOUT DIFFERENTIAL - Abnormal; Notable for the following:        Result Value    RBC 3.76 (*)     Hematocrit 35.4 (*)     All other components within normal limits   COMP " METABOLIC PANEL - Abnormal; Notable for the following:     Potassium 2.8 (*)     Total Protein 5.8 (*)     All other components within normal limits   TROPONIN   ESTIMATED GFR      All labs reviewed by me.    EKG  12 Lead EKG interpreted by me to show:  Indication: Arrhythmia   Sinus bradycardic   Rate 52  Axis: Normal  Intervals: Normal  T wave flattening in lead I and aVL  T wave present in V1 and V2  Normal ST segments  My impression of this EKG: No STEMI.     RADIOLOGY  DX-FEMUR-2+ LEFT   Final Result      Mildly displaced and likely comminuted LEFT proximal femur intertrochanteric fracture.      DX-HIP-UNILATERAL-WITH PELVIS-1 VIEW LEFT   Final Result      1.  LEFT proximal femur intertrochanteric fracture, mildly displaced and likely comminuted.   2.  Moderate degenerative change of both hips.   3.  No pelvic fracture.      CT-LSPINE W/O PLUS RECONS   Final Result      1.  Moderate to severe multilevel degenerative change of lumbar spine with levoconvex curvature.   2.  No acute lumbar spine fracture or subluxation.      CT-CSPINE WITHOUT PLUS RECONS   Final Result      1.  Multilevel degenerative change of cervical spine with associated minimal retrolisthesis at C3-4, C4-5 and C6-7.   2.  No acute fracture.      CT-HEAD W/O   Final Result      1.  Diffuse atrophy and white matter changes.   2.  No acute intracranial hemorrhage or territorial infarct.           The radiologist's interpretation of all radiological studies have been reviewed by me.    COURSE & MEDICAL DECISION MAKING  Nursing notes, VS, KIRKHx reviewed in chart.    83 y.o. female p/w chief complaint of left hip pain that occurred following a ground level fall which happened just prior to arrival.    8:46 PM Patient seen and examined at bedside. Discussed plan of care, including imaging, labs, and pain management. Patient agrees to the plan of care. The patient will be medicated with 5 mg morphine injection, 4 mg Zofran injection. Ordered for  DX-femur 2+left, DX-hip-unilateral with pelvis 1 view left, CT-head without, CT-Lspine without plus recons, CT-Cspine without plus recons, troponin, CBC with differential, CMP, EKG to evaluate her symptoms.     The differential diagnoses include but are not limited to:   #fall  Unclear if syncope or mechanical  Unremarkable ECG and neg trop, doubt ACS    No T spine TTP, doubt fx  CT scan of head and Lumbar ordered given age and fall as well as potential loss of consciousness, no appreciable fx  No intrathoracic or abd pain to suggest PTX, rib fx or BAT  Pt hypoK, given mg and K  Pt w/ bradycardia in ED, perhaps etiology of fall. Unclear.  Plan for tele admission    #Intertrochanteric fracture  Spoke with Dr. Castillo w/ potential plan for OR in AM  Pt w/ hx of opiate abuse and currently son is her medical decision maker.  See RN note for phone #.  Pt may require SNF placement as son has difficult time caring for her needs at baseline.    ED timeline  10:22 PM Paged ortho.     10:36 PM I discussed the patient's case and the above findings with Dr. Castillo (ortho) who would like that patient to be admitted to medicine. Paged [facility] at this time.     10:58 PM I discussed the patient's case and the above findings with Dr. Gomez ([facility]) who agrees to admit the patient. Given low BP and NPO, pt will be given IVF. Pt w/ improvement in BP after IVF.     DISPOSITION:  Patient will be admitted to Dr. Gomez in guarded condition.      FINAL IMPRESSION  1. Hypokalemia    2. Closed displaced intertrochanteric fracture of left femur, initial encounter ([facility])    3. Fall from ground level    4. Bradycardia          Omar MACE (Scribe), am scribing for, and in the presence of, Wesly Mathew M.D..    Electronically signed by: Omar Duenas (Scribe), 10/22/2018    Wesly MACE M.D. personally performed the services described in this documentation, as scribed by Omar Duenas in my  presence, and it is both accurate and complete.     The note accurately reflects work and decisions made by me.  Wesly Mathew  10/23/2018  12:09 AM

## 2018-10-23 NOTE — ED TRIAGE NOTES
Pt arrives via medflight from home with reports of trip and GLF. Pt reportedly tripped over her dog. Pt now c/o L hip pain and L lower back pain. Pt has hx of chronic back pain. Pt also had +LOC described as vagal episode in flight. Pt did not hit head. Pt appears uncomfortable, anxious. Pt given 150mg fentanyl pta with little improvement in pain.

## 2018-10-23 NOTE — PROGRESS NOTES
Patient seems to be more comfortable, but still does not remember she has a hip fracture. Attempted to contact son with number on file, no response.

## 2018-10-23 NOTE — H&P
"      Internal Medicine Admitting History and Physical    Note Author: Kriss Freitas M.D.       Name Madhavi Montiel 1935   Age/Sex 83 y.o. female   MRN [id number]   Code Status Full Code     After 5PM or if no immediate response to page, please call for cross-coverage  Attending: Carmen  Team: White See patient list for primary contact information  Call [phone] to page    1st Call: Day Intern, R1  Justina 2nd Call: Day Sr. Resident, R2-R3  Chandrakant       Chief Complaint  L hip pain following fall and LOC      HPI  83 year old female with history of Alzheimer's disease on donepezil, chronic lower back pain, HLD and HTN who was air-lifted from [place], [place] for L hip pain following a mechanical GLF. Per chart review, patient reported tripping over her cat with onset of L hip pain, arrived from home via med-flight, and had LOC (described as a vasovagal episode) in flight.     Patient was unable to provide a consistent account of her HPI, she reported tripping over her cat to the senior resident, but was unable to recall tripping or falling during my interview. She stated that her son's girlfriend called 911 and that the last thing she remembered doing was sitting on her couch and reading a book. On further questioning she stated that she may have bumped into \"something\", \"like the kitchen counter\", and may have fallen down and hit her head.     She endorsed back pain and denied hip pain during my interview. She described her back pain as chronic, constant and 8/10 in severity. She reported taking aspirin and tramadol for her pain with minimal relief. She denied associated symptoms of dizziness, nausea, vomiting, fevers, chills, HA, change in vision, focal weakness, abnormal brusing or bleeding, numbness and tingling.    She reports walking independently at baseline and denied any chronic medical problems other than back pain, but noted taking aspirin and HTN medications. She denied diabetes, " doesn't think she has a hx of MI or stroke, and stated that she may have arthritis given her low back pain.    She reports that she quit smoking 1 month ago and has a tobacco use history of 1 ppd x 60 years.  She reports having a history of alcohol use and states that she may drink a glass of wine every night.  She denies other drug use but notes trying marijuana with her son in the recent past.      Review of Systems   Constitutional: Negative for chills, diaphoresis, fever and weight loss.   HENT: Negative for nosebleeds and sore throat.    Eyes: Negative for blurred vision and photophobia.   Respiratory: Negative for cough, hemoptysis and shortness of breath.    Gastrointestinal: Negative for abdominal pain, blood in stool, constipation, diarrhea, melena, nausea and vomiting.   Musculoskeletal: Positive for back pain. Negative for joint pain and neck pain.   Skin: Negative for itching and rash.   Neurological: Positive for loss of consciousness (uncertain, says LOC possible) and headaches (minor, occasional, chronic). Negative for dizziness, tingling, focal weakness, seizures and weakness.   Endo/Heme/Allergies: Negative for environmental allergies and polydipsia. Does not bruise/bleed easily.   Psychiatric/Behavioral: Positive for depression (sad because she misses her cat) and memory loss. Negative for suicidal ideas.           Past Medical History  Past Medical History:   Diagnosis Date   • Back pain    • Dementia    • High cholesterol    • Hypertension        Past Surgical History  History reviewed. No pertinent surgical history.      Current Outpatient Medications  No current facility-administered medications on file prior to encounter.      No current outpatient prescriptions on file prior to encounter.       Allergies  No Known Allergies      Family History  Family History   Problem Relation Age of Onset   • No Known Problems Mother    • No Known Problems Father        Social History  Social History  "    Social History   • Marital status:      Spouse name: N/A   • Number of children: N/A   • Years of education: N/A     Occupational History   • Not on file.     Social History Main Topics   • Smoking status: Former Smoker     Packs/day: 1.00     Years: 60.00     Types: Cigarettes   • Smokeless tobacco: Never Used      Comment: reports approx. 1 ppd since around age 20 years   • Alcohol use Yes      Comment: reports drinking a glass of wine almost every day   • Drug use: No   • Sexual activity: Not on file     Other Topics Concern   • Not on file     Social History Narrative    Reports living by herself in a house in [place]. Says she has a son who visits her regularly, notes having a good relationship with her son but mentions several times that she dislikes her son's girlfriend. Reports having a cat at home.       Vitals  Vitals:    10/22/18 2038 10/22/18 2039   BP:  142/56   Pulse:  62   Resp:  15   Temp: 36.6 °C (97.9 °F)    SpO2:  100%   Weight: 51.3 kg (113 lb)    Height: 1.575 m (5' 2\")        Physical Exam  General: frail, elderly female in mild distress secondary to chronic back pain and occasional hip pain  Head: normocephalic, atraumatic, bitemporal wasting  Eyes: EOMI, normal conjunctivae, sclerae anicteric  Throat: missing teeth, poor dentition, dry oral mucosa  Neck: supple, no thyromegaly, JVD or lymphadenopathy   Heart:: slow rate, regular rhythm, BL peripheral pulses intact   Lungs: anterior fields clear to ausculation bilaterally, no increased work of breathing  Abdomen: thin, soft, no organomegaly, non-tender non-distended, no peritoneal signs   Musculoskeletal: no TTP over pelvis, trochanteric region, or distal femoral shaft bilaterally; diffuse muscle wasting and subcutaneous fat loss   Extremities: no cyanosis, edema or deformity bilaterally; some shortening and external rotation of LLE with limited ROM secondary to pain  Skin: warm, dry, intact; no suspicious rashes or lesions, no " ecchymosis at or around L hip  Neurological: alert, oriented to person and situation, CNs grossly intact, sensation intact bilaterally, 5/5 strength with dorsiflexion in RLE, 4+/5 strength in LLE with dorsiflexion due to L hip pain  Psych: normal mood and affect, poor memory      Lab Data  Recent Results (from the past 24 hour(s))   CBC WITHOUT DIFFERENTIAL    Collection Time: 10/22/18  8:44 PM   Result Value Ref Range    WBC 8.5 4.8 - 10.8 K/uL    RBC 3.76 (L) 4.20 - 5.40 M/uL    Hemoglobin 12.0 12.0 - 16.0 g/dL    Hematocrit 35.4 (L) 37.0 - 47.0 %    MCV 94.1 81.4 - 97.8 fL    MCH 31.9 27.0 - 33.0 pg    MCHC 33.9 33.6 - 35.0 g/dL    RDW 44.2 35.9 - 50.0 fL    Platelet Count 264 164 - 446 K/uL    MPV 10.2 9.0 - 12.9 fL   COMP METABOLIC PANEL    Collection Time: 10/22/18  8:44 PM   Result Value Ref Range    Sodium 143 135 - 145 mmol/L    Potassium 2.8 (L) 3.6 - 5.5 mmol/L    Chloride 103 96 - 112 mmol/L    Co2 33 20 - 33 mmol/L    Anion Gap 7.0 0.0 - 11.9    Glucose 96 65 - 99 mg/dL    Bun 16 8 - 22 mg/dL    Creatinine 0.82 0.50 - 1.40 mg/dL    Calcium 9.2 8.5 - 10.5 mg/dL    AST(SGOT) 15 12 - 45 U/L    ALT(SGPT) 14 2 - 50 U/L    Alkaline Phosphatase 57 30 - 99 U/L    Total Bilirubin 0.8 0.1 - 1.5 mg/dL    Albumin 3.6 3.2 - 4.9 g/dL    Total Protein 5.8 (L) 6.0 - 8.2 g/dL    Globulin 2.2 1.9 - 3.5 g/dL    A-G Ratio 1.6 g/dL   TROPONIN    Collection Time: 10/22/18  8:44 PM   Result Value Ref Range    Troponin I <0.01 0.00 - 0.04 ng/mL   ESTIMATED GFR    Collection Time: 10/22/18  8:44 PM   Result Value Ref Range    GFR If African American >60 >60 mL/min/1.73 m 2    GFR If Non African American >60 >60 mL/min/1.73 m 2   EKG (NOW)    Collection Time: 10/22/18 10:05 PM   Result Value Ref Range    Report       [facility] Emergency Dept[facility]    Test Date:  2018-10-22  Pt Name:    DONTE ANDUJAR                Department: ER  MRN:        [id number]                      Room:       [facility]  Gender:     Female                        Technician: [id number]  :        1935                   Requested By:JONATHAN URIBE  Order #:    [id number]                    Reading MD:    Measurements  Intervals                                Axis  Rate:       52                           P:          79  RI:         184                          QRS:        76  QRSD:       82                           T:          58  QT:         500  QTc:        465    Interpretive Statements  SINUS BRADYCARDIA  PROBABLE ANTEROSEPTAL INFARCT, AGE INDETERM  No previous ECG available for comparison         Imaging and Procedures  Independant Imaging Review: Completed    DX-FEMUR-2+ LEFT   Final Result      Mildly displaced and likely comminuted LEFT proximal femur intertrochanteric fracture.      DX-HIP-UNILATERAL-WITH PELVIS-1 VIEW LEFT   Final Result      1.  LEFT proximal femur intertrochanteric fracture, mildly displaced and likely comminuted.   2.  Moderate degenerative change of both hips.   3.  No pelvic fracture.      CT-LSPINE W/O PLUS RECONS   Final Result      1.  Moderate to severe multilevel degenerative change of lumbar spine with levoconvex curvature.   2.  No acute lumbar spine fracture or subluxation.      CT-CSPINE WITHOUT PLUS RECONS   Final Result      1.  Multilevel degenerative change of cervical spine with associated minimal retrolisthesis at C3-4, C4-5 and C6-7.   2.  No acute fracture.      CT-HEAD W/O   Final Result      1.  Diffuse atrophy and white matter changes.   2.  No acute intracranial hemorrhage or territorial infarct.           Assessment and Plan    Essential hypertension, benign  Chronic condition, on lisinopril and metoprolol at home  BP 89-91/39-45 mmHg and HR 48-58 on admission, BP improved to 136/51 mmHg following 1 L IVF bolus    Continue home Metoprolol SR 25 mg PO daily, hold for HR < 60 bpm  Continue home Lisinopril 20 mg PO daily, hold for BP < 90/50 mmHg    Hypokalemia  K 2.9 on admission  EKG showed sinus  bradycardia, no arrhythmia or T wave abnormalities, QTc 465 ms   Administered 10 mEq KCL IV and 40 mEq KCl PO,  Repeat K 3.0    CTM, replete for K > 3.5    Hip fracture, left (HCC)  Presented after a GLF and LOC per chart review.  Patient was unable to consistently recall tripping, falling and losing consciousness.  Reported chronic back pain unchanged from baseline + occasional L hip pain.   Denied dizziness, nausea, vomiting, fevers, chills, HA, change in vision, focal weakness, abnormal brusing or bleeding, numbness and tingling.    No signs or symptoms of neurovascular compromise, Hgb 12.0 on admission.  L hip x-ray showed mildly displaced and likely comminuted L proximal femur intertrochanteric fracture with intact femur shaft and no evidence of pelvic fracture or femoral dislocation.  Non-contrast CT head and spine negative for acute bleed or intracranial abnormality.    Etiology of fall unclear, could be mechanical or secondary to syncope.     Ortho consulted by the EDP, Dr. Castillo to evaluate patient 10/23/18  Telemetry monitoring   Holding home Metoprolol for bradycardia  Orthostatic vital signs, BAL and UDS   NPO with sips for meds for possible surgery 10/23/18  Resume home Norco  mg PO q6h PRN for moderate pain  Morphine 2 mg IV q4h PRN for severe pain  Monitor hemodynamic status; type and screen ordered  Echo pre-op and given potential need for blood transfusion   Monitor for signs and symptoms of infection, thromboembolism, nonunion, pressure sores    Anemia  Hgb 12.0 on admission, repeat H and H following 1 L IVF bolus for hypotension significant for Hgb 10.0  No signs of bleeding on physical exam  CT head and spine negative for acute bleed or intracranial abnormality     Continue to monitor hemodynamic status  Type and screen ordered   Transfuse for Hgb < 7.0    Hyperlipidemia  Chronic condition, on atorvastatin 40 mg PO daily     Continue home statin      Anticipated Hospital Stay:  >2  midnights  Quality-Core Measures: SCDs for DVT ppx  PCP: No primary care provider on file

## 2018-10-23 NOTE — ED NOTES
Break RN: Patient tearful, crying. Requesting water to drink. Patient informed of plan of care and reason behind no water.

## 2018-10-23 NOTE — ED NOTES
Pt confused at times, has short term memory loss. Pt requesting to get up and walk to bathroom. Pt reminded of dx of hip fx. Pt placed on bedpan. Pt moaning, crying, shaking, appears to be uncomfortable. Prn pain meds given. Awaiting admit bed. Will continue to monitor.

## 2018-10-23 NOTE — PROGRESS NOTES
C/o left hip pain  XR shows left IT fx  Plan IMN  Discussed with patient (and son by phone)  Left hip marked  Consent signed

## 2018-10-23 NOTE — ED NOTES
Med rec updated and complete.  Allergies reviewed. Met with pt at bedside and dicussed   Current medications and last doses taken.  Pt denies antibiotic use in last 30 days.  Current prescription bottles at bedside. Some of the current  Bottles are empty.  One controlled substance noted.

## 2018-10-23 NOTE — ED NOTES
Pt resting in bed. Pain appears to be well controled at this time s/p nerve block by erp. Pt still reports some back pain. Pt remains confused. Labs drawn and send to lab. Pt continues to request water. Pt reminded of npo status. Mouth swab provided. No further needs noted. Will continue to monitor.

## 2018-10-23 NOTE — ED NOTES
Spoke with [facility] admitting at this time, will recheck potassium level then will admit to appropriate level of care.

## 2018-10-23 NOTE — PROGRESS NOTES
Internal Medicine Interval Note  Note Author: Lisa Horn M.D.     Name Madhavi Montiel 1935   Age/Sex 83 y.o. female   MRN [id number]   Code Status FULL     After 5PM or if no immediate response to page, please call for cross-coverage  Attending/Team: Dr. Brenda Morales/Vladimir See Patient List for primary contact information  Call [phone] to page    1st Call - Day Intern (R1):   Dr. Lisa Horn  2nd Call - Day Sr. Resident (R2/R3):   Dr. Ahsan Stallings      Reason for interval visit  (Principal Problem)   Intertrochanteric fracture of left femur, closed, initial encounter (HCC)    Interval Problem Daily Status Update  (24 hours, problem oriented, brief subjective history, new lab/imaging data pertinent to that problem)   Patient complains of left hip pain, but cannot recall that she is in the hospital with a hip fracture due to a fall. It is difficult to ascertain exactly what happened from the patient herself, but it seems as she had a ground-level fall at home in her kitchen tripping over her dog.  She has no other complaints other than she keeps requesting water and has to be reminded that she is going to have surgery later this afternoon.  On lab work patient had hypokalemia which was repleted with IV potassium chloride.  Physical exam findings were consistent with left hip fracture, and the patient also had a grade 2 systolic ejection murmur.  As this patient has no known history of cardiac murmur, echo will be done to further evaluate. Orthopedic surgery saw the patient, and will do surgery this afternoon.       Review of Systems   Unable to perform ROS: Dementia     Disposition/Barriers to discharge:   Guarded    Consultants/Specialty  Dr. River Castillo -orthopedic surgery  PCP: Dr. Raji Meyer    Quality Measures  Quality-Core Measures   Reviewed items::  Labs reviewed, Medications reviewed, EKG reviewed and Radiology images reviewed  Mcmanus catheter::  No Mcmanus  DVT prophylaxis -  mechanical:  SCDs  Ulcer Prophylaxis::  Not indicated    Physical Exam     Vitals:    10/23/18 0945 10/23/18 1000 10/23/18 1030 10/23/18 1148   BP:       Pulse: (!) 54 (!) 56 (!) 57 (!) 59   Resp: 14 16 18 20   Temp:       SpO2: 98% 93% 98% 96%   Weight:       Height:         Body mass index is 20.67 kg/m². Weight: 51.3 kg (113 lb)  Oxygen Therapy:  Pulse Oximetry: 96 %, O2 (LPM): 4, O2 Delivery: Nasal Cannula    Physical Exam   Constitutional:   Frail-appearing elderly female  Alert, oriented to self   HENT:   Head: Normocephalic and atraumatic.   Mouth/Throat: Oropharynx is clear and moist.   Eyes: Conjunctivae and EOM are normal.   Neck: Normal range of motion. Neck supple.   Cardiovascular: Regular rhythm and intact distal pulses.  Bradycardia present.  PMI is not displaced.    Murmur heard.   Systolic murmur is present with a grade of 3/6   Pulmonary/Chest: Effort normal. No respiratory distress. She has no wheezes. She has no rales.   Abdominal: Soft. Bowel sounds are normal. She exhibits no distension. There is no tenderness.   Musculoskeletal:   Externally rotated left lower extremity, no obvious bruising, open lacerations or abrasions at the left hip  with tenderness to palpation at the left lateral hip,   Lymphadenopathy:     She has no cervical adenopathy.   Neurological: She is alert.   Skin: Skin is warm and dry.   Psychiatric: Affect normal.   Nursing note and vitals reviewed.    Assessment/Plan     * Intertrochanteric fracture of left femur, closed, initial encounter (HCC)- (present on admission)   Overview    GLF (10/22/18), patient states she tripped over her dog in the kitchen and fell  Her son's girlfriend found her and called EMS, patient arrived to [facility] ED via flight care  During flight, Ems witnessed a vasovagal syncopal episode.   XR showed likely communited (L) intertrochanteric fracture     Assessment & Plan    NPO with sips for meds for surgery with Dr. Castillo  [facility]   mg PO q6h PRN for moderate pain  Morphine 5 mg IV q4h PRN for severe pain        Aortic systolic murmur on examination- (present on admission)   Assessment & Plan    No known history of heart disease  Records were obtained from her PCP who she last saw her in April 2018 with referral to a cardiologist, however no records of actually seeing the cardiologist.  Unable to obtain a reliable history from the patient.   No echo done previously, no mention of cardiac murmur noted previously  -For echo to evaluate systolic murmur  -Continue cardiac monitoring        Hypokalemia- (present on admission)   Assessment & Plan    K 2.9 on admission  EKG showed sinus bradycardia, no arrhythmia or T wave abnormalities, QTc 465 ms   -Replete as necessary  -Monitor        Dyslipidemia- (present on admission)   Assessment & Plan    Chronic condition    Continue atorvastatin        Normocytic normochromic anemia- (present on admission)   Assessment & Plan    Hgb 12.0 on admission, repeat H and H following 1 L IVF bolus for hypotension for Hgb 10.0  No signs of bleeding on physical exam  CT head and spine negative for acute bleed or intracranial abnormality     Continue to monitor hemodynamic status  Transfuse for Hgb < 7.0        Essential hypertension, benign- (present on admission)   Assessment & Plan    Chronic condition, on lisinopril and metoprolol at home  BP 89-91/39-45 mmHg and HR 48-58 on admission, BP improved to 136/51 mmHg following 1 L IVF bolus    Hold beta-blocker for now  May continue lisinopril

## 2018-10-23 NOTE — SENIOR ADMIT NOTE
"Senior Admit Note    Patient: Madhavi Montiel.  MRN: [id number].                               Chief complaint: hip pain, ground level fall.     Objective:  Pertinent vitals/physical findings: Back: TTP lumbar spine.  MSK/extremities: TTP left hip, which is externally rotated at rest.      Labs:  H/H 12.0/35.4.  K 2.8.  Troponin negative x1.      Imaging:  - CT spine - \"Multilevel degenerative change of cervical spine with associated minimal retrolisthesis at C3-4, C4-5 and C6-7.  No acute fracture.\"  - CT head - \"Diffuse atrophy and white matter changes.  No acute intracranial hemorrhage or territorial infarct.\"  - CT lumbar spine - \"Moderate to severe multilevel degenerative change of lumbar spine with levoconvex curvature.  No acute lumbar spine fracture or subluxation.\"  - Xray femur left - \"Mildly displaced and likely comminuted LEFT proximal femur intertrochanteric fracture.\"  - Xray hip left - \"LEFT proximal femur intertrochanteric fracture, mildly displaced and likely comminuted.  Moderate degenerative change of both hips.  No pelvic fracture.\"    Assessment/plan for main hospital problem:  83 yoF with PMHx dementia here with GLF and subsequent left proximal femur intertrochanteric fracture.  EDP consulted Orthopedic Surgery, Dr. Castillo, who will see patient in the morning and plans for possible surgery tomorrow.  NPO.  Admit to Telemetry.  Replete K, 2.8 on admission.     On interview with me, patient appears clear that fall was mechanical.  Denied presyncopal symptoms, denied signs of seizure.  On interview with intern, however, patient reported she could not remember fall and that she was possibly dizzy.  Given this, her h/o dementia, and her hypotension in the ED, will work up for transient loss of consciousness.  Echo.  Orthostatics (may not be possible given hip pain).  Replete electrolytes PRN.  UDS.  BAL.  PT/OT consult after surgery.      DVT prophylaxis: SCDs.   Code status: FULL.    For complete " details, please refer to H&P by intern, Dr. Freitas.     Jazmin Gomez M.D.

## 2018-10-23 NOTE — ED NOTES
Break RN:  Pt needing to void, pt placed on bedpan. Pt c/o LEFT hip pain. [facility] admitting at bedside, made aware of pt's pain.

## 2018-10-23 NOTE — ASSESSMENT & PLAN NOTE
Likely due to poor oral intake  K= 3.2, improved after oral KCl  -Continue to replete potassium as needed   -Monitor CMP

## 2018-10-23 NOTE — ED NOTES
Son at bedside at this time, son is POA for  Pt and son updated with plan of care. Pt placed on bedpan per request. Pt offered catheter d/t hip fx. Pt refused. Pt denies any other needs. Will continue to monitor.

## 2018-10-23 NOTE — ED PROVIDER NOTES
ED PROVIDER NOTE    Scribed for No att. providers found by Katiuska Rocha. 10/23/2018, 4:17 AM.    This is an addendum to the note on Madhavi Montiel. For further details and full chart entry, see the previously signed ED Provider Note written by Dr. Mathew ([facility]).      4:17 AM - Patient is awaiting transfer to the floor and is in extreme pain. Will perform a nerve block procedure for pain control.     Procedure  Femoral nerve Block:  4:18 AM performed by Dr Daley  Sterile process was used  Indication: left hip pain secondary to fracture   Consent: verbal from the patient  Location: left femoral nerve  Anesthesia: 2% lido 10 cc, 0.25% bupivacaine 10 cc   Procedure: US guided, nerve visualized lateral of the artery.   Toleration: the patient tolerated well, no immediate complications or bleeding  Total procedure time: 7 minutes    FINAL IMPRESSION   1. Hypokalemia    2. Closed displaced intertrochanteric fracture of left femur, initial encounter ([facility])    3. Fall from ground level    4. Bradycardia       I, Katiuska Rocha (Scribe), am scribing for, and in the presence of, No att. providers found.    Electronically signed by: Katiuska Rocha (Scribe), 10/23/2018    I, No att. providers found personally performed the services described in this documentation, as scribed by Katiuska Rocha in my presence, and it is both accurate and complete.    The note accurately reflects work and decisions made by me.  Sherry Daley  10/23/2018  5:22 AM

## 2018-10-23 NOTE — ASSESSMENT & PLAN NOTE
Hemoglobin stable.  Most likely due to blood loss during operation on IV fluid.  Asymptomatic.  No signs of active bleeding  -Continue to monitor hemodynamic status

## 2018-10-23 NOTE — ASSESSMENT & PLAN NOTE
Surgery was done with intramedullary nailing of left hip, with no complications.  Plan  -Continue home pain management, prn meds   -Cont PT/OT at SNF   -Continue DVT prophylaxis until ambulatory >150'  -Sutures/Staples out- 10-14 days post operatively

## 2018-10-23 NOTE — ASSESSMENT & PLAN NOTE
Ejection systolic murmur at aortic area radiated to both carotids on examination, aortic stenosis murmur.  Records were obtained from her PCP who she last saw her in April 2018 with referral to a cardiologist, however no records of actually seeing the cardiologist.  Unable to obtain a reliable history from the patient.   No cardiovascular symptoms at current time.  Echo done with no pertinent findings EF: 65%  -Observe

## 2018-10-23 NOTE — ED NOTES
Contact # for pt's son, Mayito [phone]. Dr gomez discussed pt status with son. Son provided work note per vo from dr gomez.

## 2018-10-23 NOTE — ED NOTES
Pt updated with plan of care, pt in nad. resp equal and unlabored. Pt provided additional water per request. Will continue to monitor.

## 2018-10-24 ENCOUNTER — APPOINTMENT (OUTPATIENT)
Dept: CARDIOLOGY | Facility: MEDICAL CENTER | Age: 83
DRG: 481 | End: 2018-10-24
Attending: STUDENT IN AN ORGANIZED HEALTH CARE EDUCATION/TRAINING PROGRAM
Payer: MEDICARE

## 2018-10-24 LAB
ALBUMIN SERPL BCP-MCNC: 2.8 G/DL (ref 3.2–4.9)
ALBUMIN/GLOB SERPL: 1.6 G/DL
ALP SERPL-CCNC: 38 U/L (ref 30–99)
ALT SERPL-CCNC: 10 U/L (ref 2–50)
ANION GAP SERPL CALC-SCNC: 5 MMOL/L (ref 0–11.9)
AST SERPL-CCNC: 12 U/L (ref 12–45)
BILIRUB SERPL-MCNC: 0.6 MG/DL (ref 0.1–1.5)
BUN SERPL-MCNC: 12 MG/DL (ref 8–22)
CALCIUM SERPL-MCNC: 8.1 MG/DL (ref 8.5–10.5)
CHLORIDE SERPL-SCNC: 108 MMOL/L (ref 96–112)
CO2 SERPL-SCNC: 26 MMOL/L (ref 20–33)
CREAT SERPL-MCNC: 0.63 MG/DL (ref 0.5–1.4)
ERYTHROCYTE [DISTWIDTH] IN BLOOD BY AUTOMATED COUNT: 46.6 FL (ref 35.9–50)
GLOBULIN SER CALC-MCNC: 1.8 G/DL (ref 1.9–3.5)
GLUCOSE SERPL-MCNC: 115 MG/DL (ref 65–99)
HCT VFR BLD AUTO: 25.2 % (ref 37–47)
HGB BLD-MCNC: 8.2 G/DL (ref 12–16)
LV EJECT FRACT  99904: 65
LV EJECT FRACT MOD 2C 99903: 79.12
LV EJECT FRACT MOD 4C 99902: 75.32
LV EJECT FRACT MOD BP 99901: 77.25
MCH RBC QN AUTO: 32 PG (ref 27–33)
MCHC RBC AUTO-ENTMCNC: 32.5 G/DL (ref 33.6–35)
MCV RBC AUTO: 98.4 FL (ref 81.4–97.8)
PLATELET # BLD AUTO: 159 K/UL (ref 164–446)
PMV BLD AUTO: 10.1 FL (ref 9–12.9)
POTASSIUM SERPL-SCNC: 3.6 MMOL/L (ref 3.6–5.5)
PROT SERPL-MCNC: 4.6 G/DL (ref 6–8.2)
RBC # BLD AUTO: 2.56 M/UL (ref 4.2–5.4)
SODIUM SERPL-SCNC: 139 MMOL/L (ref 135–145)
WBC # BLD AUTO: 6.1 K/UL (ref 4.8–10.8)

## 2018-10-24 PROCEDURE — G8979 MOBILITY GOAL STATUS: HCPCS | Mod: CJ

## 2018-10-24 PROCEDURE — G8988 SELF CARE GOAL STATUS: HCPCS | Mod: CJ

## 2018-10-24 PROCEDURE — 93306 TTE W/DOPPLER COMPLETE: CPT

## 2018-10-24 PROCEDURE — A9270 NON-COVERED ITEM OR SERVICE: HCPCS | Performed by: STUDENT IN AN ORGANIZED HEALTH CARE EDUCATION/TRAINING PROGRAM

## 2018-10-24 PROCEDURE — 85027 COMPLETE CBC AUTOMATED: CPT

## 2018-10-24 PROCEDURE — 99233 SBSQ HOSP IP/OBS HIGH 50: CPT | Mod: GC | Performed by: INTERNAL MEDICINE

## 2018-10-24 PROCEDURE — 80053 COMPREHEN METABOLIC PANEL: CPT

## 2018-10-24 PROCEDURE — A9270 NON-COVERED ITEM OR SERVICE: HCPCS | Performed by: INTERNAL MEDICINE

## 2018-10-24 PROCEDURE — 97166 OT EVAL MOD COMPLEX 45 MIN: CPT

## 2018-10-24 PROCEDURE — 700111 HCHG RX REV CODE 636 W/ 250 OVERRIDE (IP): Performed by: INTERNAL MEDICINE

## 2018-10-24 PROCEDURE — 36415 COLL VENOUS BLD VENIPUNCTURE: CPT

## 2018-10-24 PROCEDURE — 700111 HCHG RX REV CODE 636 W/ 250 OVERRIDE (IP): Performed by: ORTHOPAEDIC SURGERY

## 2018-10-24 PROCEDURE — 700102 HCHG RX REV CODE 250 W/ 637 OVERRIDE(OP): Performed by: STUDENT IN AN ORGANIZED HEALTH CARE EDUCATION/TRAINING PROGRAM

## 2018-10-24 PROCEDURE — 93306 TTE W/DOPPLER COMPLETE: CPT | Mod: 26 | Performed by: INTERNAL MEDICINE

## 2018-10-24 PROCEDURE — 700111 HCHG RX REV CODE 636 W/ 250 OVERRIDE (IP): Performed by: STUDENT IN AN ORGANIZED HEALTH CARE EDUCATION/TRAINING PROGRAM

## 2018-10-24 PROCEDURE — 770020 HCHG ROOM/CARE - TELE (206)

## 2018-10-24 PROCEDURE — 97162 PT EVAL MOD COMPLEX 30 MIN: CPT

## 2018-10-24 PROCEDURE — 700102 HCHG RX REV CODE 250 W/ 637 OVERRIDE(OP): Performed by: INTERNAL MEDICINE

## 2018-10-24 PROCEDURE — G8978 MOBILITY CURRENT STATUS: HCPCS | Mod: CM

## 2018-10-24 PROCEDURE — 700105 HCHG RX REV CODE 258: Performed by: STUDENT IN AN ORGANIZED HEALTH CARE EDUCATION/TRAINING PROGRAM

## 2018-10-24 PROCEDURE — G8987 SELF CARE CURRENT STATUS: HCPCS | Mod: CL

## 2018-10-24 RX ORDER — METOPROLOL SUCCINATE 25 MG/1
12.5 TABLET, EXTENDED RELEASE ORAL
Status: DISCONTINUED | OUTPATIENT
Start: 2018-10-24 | End: 2018-10-26

## 2018-10-24 RX ADMIN — CITALOPRAM HYDROBROMIDE 20 MG: 20 TABLET ORAL at 05:12

## 2018-10-24 RX ADMIN — SENNOSIDES AND DOCUSATE SODIUM 2 TABLET: 8.6; 5 TABLET ORAL at 05:12

## 2018-10-24 RX ADMIN — HYDROCODONE BITARTRATE AND ACETAMINOPHEN 1 TABLET: 10; 325 TABLET ORAL at 14:59

## 2018-10-24 RX ADMIN — SENNOSIDES AND DOCUSATE SODIUM 2 TABLET: 8.6; 5 TABLET ORAL at 17:58

## 2018-10-24 RX ADMIN — CEFAZOLIN SODIUM 1 G: 1 INJECTION, SOLUTION INTRAVENOUS at 14:59

## 2018-10-24 RX ADMIN — MORPHINE SULFATE 5 MG: 10 INJECTION INTRAVENOUS at 01:12

## 2018-10-24 RX ADMIN — MORPHINE SULFATE 5 MG: 10 INJECTION INTRAVENOUS at 17:58

## 2018-10-24 RX ADMIN — CEFAZOLIN SODIUM 1 G: 1 INJECTION, SOLUTION INTRAVENOUS at 05:13

## 2018-10-24 RX ADMIN — MORPHINE SULFATE 5 MG: 10 INJECTION INTRAVENOUS at 08:04

## 2018-10-24 RX ADMIN — ATORVASTATIN CALCIUM 40 MG: 40 TABLET, FILM COATED ORAL at 17:58

## 2018-10-24 RX ADMIN — ENOXAPARIN SODIUM 40 MG: 100 INJECTION SUBCUTANEOUS at 06:25

## 2018-10-24 RX ADMIN — METOPROLOL SUCCINATE 12.5 MG: 25 TABLET, EXTENDED RELEASE ORAL at 14:59

## 2018-10-24 RX ADMIN — BUPROPION HYDROCHLORIDE 150 MG: 150 TABLET, EXTENDED RELEASE ORAL at 05:17

## 2018-10-24 RX ADMIN — MORPHINE SULFATE 5 MG: 10 INJECTION INTRAVENOUS at 23:12

## 2018-10-24 RX ADMIN — HYDROCODONE BITARTRATE AND ACETAMINOPHEN 1 TABLET: 10; 325 TABLET ORAL at 05:12

## 2018-10-24 RX ADMIN — LISINOPRIL 20 MG: 20 TABLET ORAL at 05:12

## 2018-10-24 RX ADMIN — OMEPRAZOLE 40 MG: 20 CAPSULE, DELAYED RELEASE ORAL at 05:12

## 2018-10-24 RX ADMIN — SODIUM CHLORIDE: 9 INJECTION, SOLUTION INTRAVENOUS at 06:50

## 2018-10-24 RX ADMIN — SODIUM CHLORIDE: 9 INJECTION, SOLUTION INTRAVENOUS at 17:58

## 2018-10-24 RX ADMIN — CEFAZOLIN SODIUM 1 G: 1 INJECTION, SOLUTION INTRAVENOUS at 00:38

## 2018-10-24 RX ADMIN — DONEPEZIL HYDROCHLORIDE 5 MG: 5 TABLET, FILM COATED ORAL at 17:58

## 2018-10-24 ASSESSMENT — COGNITIVE AND FUNCTIONAL STATUS - GENERAL
WALKING IN HOSPITAL ROOM: TOTAL
MOVING FROM LYING ON BACK TO SITTING ON SIDE OF FLAT BED: UNABLE
TOILETING: A LOT
PERSONAL GROOMING: A LITTLE
MOBILITY SCORE: 7
SUGGESTED CMS G CODE MODIFIER MOBILITY: CM
DAILY ACTIVITIY SCORE: 13
DRESSING REGULAR LOWER BODY CLOTHING: TOTAL
HELP NEEDED FOR BATHING: A LOT
EATING MEALS: A LITTLE
STANDING UP FROM CHAIR USING ARMS: A LOT
SUGGESTED CMS G CODE MODIFIER DAILY ACTIVITY: CL
TURNING FROM BACK TO SIDE WHILE IN FLAT BAD: UNABLE
CLIMB 3 TO 5 STEPS WITH RAILING: TOTAL
DRESSING REGULAR UPPER BODY CLOTHING: A LOT
MOVING TO AND FROM BED TO CHAIR: UNABLE

## 2018-10-24 ASSESSMENT — PAIN SCALES - GENERAL
PAINLEVEL_OUTOF10: 6
PAINLEVEL_OUTOF10: 1
PAINLEVEL_OUTOF10: 1
PAINLEVEL_OUTOF10: 5
PAINLEVEL_OUTOF10: 5
PAINLEVEL_OUTOF10: 8
PAINLEVEL_OUTOF10: 5
PAINLEVEL_OUTOF10: 4
PAINLEVEL_OUTOF10: ASSUMED PAIN PRESENT
PAINLEVEL_OUTOF10: ASSUMED PAIN PRESENT
PAINLEVEL_OUTOF10: 5
PAINLEVEL_OUTOF10: 4
PAINLEVEL_OUTOF10: 6
PAINLEVEL_OUTOF10: 4
PAINLEVEL_OUTOF10: ASSUMED PAIN PRESENT

## 2018-10-24 ASSESSMENT — GAIT ASSESSMENTS: GAIT LEVEL OF ASSIST: UNABLE TO PARTICIPATE

## 2018-10-24 ASSESSMENT — ACTIVITIES OF DAILY LIVING (ADL): TOILETING: UNABLE TO DETERMINE AT THIS TIME

## 2018-10-24 NOTE — CARE PLAN
Problem: Safety  Goal: Will remain free from injury    Intervention: Provide assistance with mobility  Assist with ambulation, determine ambulation tolerance. Remind to use call light before getting out of bed        Problem: Pain Management  Goal: Pain level will decrease to patient's comfort goal    Intervention: Follow pain managment plan developed in collaboration with patient and Interdisciplinary Team  Discuss 0-10 pain scales, administer pain medication as ordered.

## 2018-10-24 NOTE — CARE PLAN
Problem: Safety  Goal: Will remain free from injury  Outcome: PROGRESSING AS EXPECTED    Goal: Will remain free from falls  Outcome: PROGRESSING AS EXPECTED      Problem: Infection  Goal: Will remain free from infection  Outcome: PROGRESSING AS EXPECTED      Problem: Pain Management  Goal: Pain level will decrease to patient's comfort goal  Outcome: PROGRESSING SLOWER THAN EXPECTED      Problem: Psychosocial Needs:  Goal: Level of anxiety will decrease  Outcome: PROGRESSING SLOWER THAN EXPECTED

## 2018-10-24 NOTE — PROGRESS NOTES
Assumed care at 1900, bedside report received from day shift RN. Pt. Is NSR on the monitor. Initial assessment completed, orders reviewed, call light within reach, bed alarm in use, and hourly rounding in place. POC addressed with patient, no additional questions at this time.

## 2018-10-24 NOTE — PROGRESS NOTES
Internal Medicine Interval Note  Note Author: Ahsan Stallings M.D.     Name Madhavi Montiel 1935   Age/Sex 83 y.o. female   MRN [id number]   Code Status FULL     After 5PM or if no immediate response to page, please call for cross-coverage  Attending/Team: Dr. Brenda Morales/Vladimir See Patient List for primary contact information  Call [phone] to page    1st Call - Day Intern (R1):   Dr. Lisa Horn  2nd Call - Day Sr. Resident (R2/R3):   Dr. Ahsan Stallings      Reason for interval visit  (Principal Problem)   Intertrochanteric fracture of left femur, closed, initial encounter (HCC)    Interval Problem Daily Status Update  (24 hours, problem oriented, brief subjective history, new lab/imaging data pertinent to that problem)     Operation for left hip fracture was done yesterday with Intramedullary nailing of left hip, with no complications.    She has pain at her back and mild pain at left hip fracture area requiring morphine IV and Norco.  Hemoglobin dropped from 12.0 on admission to 8.2, asymptomatic, most likely due to operation.       Review of Systems   Unable to perform ROS: Dementia     Disposition/Barriers to discharge:   Guarded    Consultants/Specialty  Dr. River Castillo -orthopedic surgery  PCP: Dr. Raji Meyer    Quality Measures  Quality-Core Measures   Reviewed items::  Labs reviewed, Medications reviewed, EKG reviewed and Radiology images reviewed  Mcmanus catheter::  No Mcmanus  DVT prophylaxis - mechanical:  SCDs  Ulcer Prophylaxis::  Not indicated    Physical Exam     Vitals:    10/23/18 2323 10/24/18 0400 10/24/18 0817 10/24/18 1252   BP: 136/57 130/55 113/47 109/52   Pulse: 65 76 79 85   Resp: 16 16 16 16   Temp: 37.5 °C (99.5 °F) 37.4 °C (99.4 °F) 37.1 °C (98.8 °F) 36.2 °C (97.1 °F)   SpO2: 98% 94% 88% 99%   Weight: 51.4 kg (113 lb 5.1 oz)      Height:         Body mass index is 20.73 kg/m². Weight: 51.4 kg (113 lb 5.1 oz)  Oxygen Therapy:  Pulse Oximetry: 99 %, O2 (LPM): 2, O2  Delivery: Silicone Nasal Cannula    Physical Exam   Constitutional:   Frail-appearing elderly female  Alert, oriented to self   HENT:   Head: Normocephalic and atraumatic.   Mouth/Throat: Oropharynx is clear and moist.   Eyes: Conjunctivae and EOM are normal.   Neck: Normal range of motion. Neck supple.   Cardiovascular: Regular rhythm and intact distal pulses.  Bradycardia present.  PMI is not displaced.    Murmur heard.   Systolic murmur is present with a grade of 3/6   Ejection systolic murmur at the aortic area radiated to both carotids.   Pulmonary/Chest: Effort normal. No respiratory distress. She has no wheezes. She has no rales.   Abdominal: Soft. Bowel sounds are normal. She exhibits no distension. There is no tenderness.   Musculoskeletal:   Incision of hip fracture covered with dressing, no bleeding, looks dry and clean. No significant hematoma or bruises.  She can wiggle her toes, pulses are intact.   Lymphadenopathy:     She has no cervical adenopathy.   Neurological: She is alert.   Skin: Skin is warm and dry.   Psychiatric: Affect normal.   Nursing note and vitals reviewed.    Assessment/Plan     * Intertrochanteric fracture of left femur, closed, initial encounter (HCC)- (present on admission)   Overview    GLF (10/22/18), patient states she tripped over her dog in the kitchen and fell  Her son's girlfriend found her and called EMS, patient arrived [facility] ED via flight care  During flight, Ems witnessed a vasovagal syncopal episode.   XR showed likely communited (L) intertrochanteric fracture     Assessment & Plan    Surgery was done yesterday with intramedullary nailing of left hip, with no complications.  She has  postoperative pain and back pain.  Responded to morphine and Norco.  No signs of bleeding or ecchymosis.    Plan    Resume home Norco  mg PO q6h PRN for moderate pain  Morphine 5 mg IV q4h PRN for severe pain  PT and OT consulted  To start DVT prophylaxis with enoxaparin         Aortic systolic murmur on examination- (present on admission)   Assessment & Plan    Ejection systolic murmur at aortic area radiated to both carotids on examination, aortic stenosis murmur.  Records were obtained from her PCP who she last saw her in April 2018 with referral to a cardiologist, however no records of actually seeing the cardiologist.  Unable to obtain a reliable history from the patient.   No cardiovascular symptoms at current time    Plan    - Sent for echocardiogram  -Continue cardiac monitoring        Hypokalemia- (present on admission)   Assessment & Plan    Repleted, serum potassium 3.6 today.        Dyslipidemia- (present on admission)   Assessment & Plan    Chronic condition    Continue atorvastatin        Normocytic normochromic anemia- (present on admission)   Assessment & Plan    Hemoglobin was 12 on admission, decreased to 8.2 today.  Most likely due to blood loss during operation on IV fluid.  Asymptomatic.  No signs of active bleeding    Plan    Continue to monitor hemodynamic status  Transfuse for Hgb < 7.0        Essential hypertension, benign- (present on admission)   Assessment & Plan    Her blood pressure is stable currently.  Restarted on her lisinopril, metoprolol was held due to bradycardia.    Plan    Continue lisinopril at current dose.  To restart her metoprolol but at lower dose, 12.5 daily.

## 2018-10-24 NOTE — PROGRESS NOTES
2 RN skin check, L hip and outer thigh incisions covered w/ dry sterile dressing from surg to be changed Friday. Sacral area reddened and blanching covered w/ protective mepilex. Mcmanus in place draining clear yellow urine to gravity. Dried small scabs between toes on bilat feet. Small scab on L knee.

## 2018-10-24 NOTE — THERAPY
"Physical Therapy Evaluation completed.   Bed Mobility:  Supine to Sit: Maximal Assist  Transfers: Sit to Stand: Moderate Assist (for partial standnig; max to full stand; see below)  Gait: Level Of Assist: Unable to Participate 2' to current pain; see below    Plan of Care: Will benefit from Physical Therapy 4 times per week  Discharge Recommendations: Equipment: Will Continue to Assess for Equipment Needs. See below    Pt presents to PT with impaired ROM, joint mobility, muscle performance, balance, gait and general locomotion associated with recent fall with hip fracture that is now s/p IM nailing. She is notably limited during eval 2' to pain/anticipation of pain. Her baseline cognitive deficits are exacerbating her functional impairements as well and limited ability for appropriate compensatory strategies. She was able to demonstrate bed mobility with max A and sit<>stands with mod A. She was unable to effectively initaite pre-gait 2' to pain with weightshifting at LLE and impaired motor control/compensatory strategy. She will benefit from continued skilled acute PT while here with recommendation of continued skilled PT/placement prior to medical dc to home given current objective findings, age, current co-morbidities (including IM nailing), and limited ability of social supports to assist wtih current level needed. Anticiapte as pain/fear of pain reduces she will progress well with increased OOB activity.     See \"Rehab Therapy-Acute\" Patient Summary Report for complete documentation.     "

## 2018-10-24 NOTE — PROGRESS NOTES
Assumed care of patient, oriented to self, knew year but not month, unable to state location. Calls out in pain when staff present only. Pain seems moderately controlled w/ po and IV medication. Sinus rhythm on telemetry. Lungs clear.

## 2018-10-24 NOTE — PROGRESS NOTES
[facility]  Last chemistry panel on 10/17 showed showed creatinine 5.2, BUN 69, GFR 14, his glucose was low at 51, sodium 142, potassium 3.2, liver enzymes and other electrolytes are unremarkable.  His hemoglobin A1c is 8.3.  His echocardiogram done in August 2018 showed ejection fraction 65-70%, left ventricle hypertrophy, no wall motion abnormality, mild mitral regurgitation, mild right ventricular hypertrophy, mild pulmonary hypertension, right ventricular systolic pressure 35-40.  Seen by nephrologist, Dr. Larios on 10/18 and is not stated that the patient has progressive CKD, his blood pressure is controlled, but the patient preferred peritoneal dialysis at that time but there is no urgent or acute indication for dialysis at that time.  Seen by Dr. Carbone from endocrinology on 10/17 and stated that his diabetic condition is better  controlled now, hemoglobin A1c decreased to 8.3 from about 12.  Medication was as part of 28 TID times daily and hydralazine 50 mg 3 times daily.  Seen by cardiology and stated that he has coronary artery disease which is stable, congestive heart failure class II with normal left ventricular function, hypertension, dyslipidemia, type 2 diabetes, end-stage renal disease, obstructive sleep apnea, and increased amlodipine to 5 mg twice daily..    Medications [facility] include allopurinol 100 mg, aspirin 325 mg, atorvastatin 80 mg, calcitriol 0.25 mcg once daily, Lasix 80 mg once daily, hydralazine 50 mg 3 times daily, insulin glargine 50 units twice daily, magnesium oxide 420 mg 2 tablets daily, metoprolol 150 mg twice daily, nitroglycerin sublingual as needed, pregabalin 100 mg twice daily, amlodipine 5 mg once daily, insulin aspart 28 units 3 times daily

## 2018-10-24 NOTE — THERAPY
"Occupational Therapy Evaluation completed.   Functional Status:  Max A supine>sit, total A LB dressing, CGA seated grooming, Mod A STS, Total A Sit>supine  Plan of Care: Will benefit from Occupational Therapy 3 times per week  Discharge Recommendations:  Equipment: Will Continue to Assess for Equipment Needs. Post-acute therapy Discharge to a transitional care facility for continued skilled therapy services.    See \"Rehab Therapy-Acute\" Patient Summary Report for complete documentation.    Pt is an 82 y/o female who presents to acute 2/2 GLF fall resulting in femur fx. Pt is now s/p IM nailing and is WBAT on L LE. PMH includes Alzheimer's, HTN, and chronic pain. Pt is a poor historian and unable to report PLOF or social info. Pt primarily limited by decreased balance, functional mobility, and activity tolerance as well as increased pain. This impacts her ability to perform BADLs independently and safely. Will continue to follow for Acute OT services.     "

## 2018-10-24 NOTE — PROGRESS NOTES
Has pain  Dressings dry  SCDs on   AVSS  Hct 25  + DF/PF    Plan:    PT/OT  WBAT LLE  DVT proph  D/c planning

## 2018-10-24 NOTE — PROGRESS NOTES
Pt arrived from PACU via stretcher.  Left hip surgery done.  Transferred to bed.  IV to left arm, telemetry applied.  Pt oriented to the room, call light within reach and bed alarm set.      2 RN skin check completed with LESLIE Estrella.  Left hip island dressing dry and intact.  Sacrum pink and blanches.  Bilateral heels pink and blanches.  Small abrasion to mid left back and left upper shin.  The rest of her skin is intact.

## 2018-10-24 NOTE — OP REPORT
DATE OF SERVICE:  10/23/2018    PREOPERATIVE DIAGNOSIS:  Left intertrochanteric hip fracture.    POSTOPERATIVE DIAGNOSIS:  Left intertrochanteric hip fracture.    PROCEDURE PERFORMED:  Intramedullary nailing of left hip.    SURGEON:  Mal Conway MD    ASSISTANT:  Henry De La Paz DO    ANESTHESIOLOGIST:  Jerod Chaves MD.    ANESTHETIC:  General.    ESTIMATED BLOOD LOSS:  25 mL    INDICATIONS:  Patient is 83-years-old.  She had a fall sustaining a left   intertrochanteric hip fracture, transferred from [place].  Dr. Castillo   has asked me to oversee her definitive care.  I recommend intramedullary   nailing.  Risks include bleeding, infection, neurovascular injury, pain,   stiffness, arthritis, nonunion, malunion, thromboembolic phenomena, anesthetic   medical complications, etc.    DESCRIPTION OF PROCEDURE:  Patient was identified in the preoperative holding   area.  Left leg was marked.  She was taken to the operating room where general   anesthetic was administered.  Intravenous antibiotics were given.  She was   placed supine on the operating table.  Feet were padded and placed on the foot   rest.  Legs were scissored.  Traction and internal rotation applied to the   left lower extremity.  A fluoroscopic image showed restoration of alignment.    Lateral hip, thigh, and knee were then prepped and draped in sterile fashion.    Time-out was held to confirm the patient identity and correct surgical site.    Guidewire was inserted to the tip of the greater trochanter and then inserted   into the proximal femur.  An incision was made around the wire and a starter   reamer was placed over the wire down to the level of the lesser trochanter.    Ball tip guidewire was then passed down the medullary canal to the distal   femur, this passed a 12 mm reamer and then I inserted OIC 320x11 mm hip nail.    Prior to inserting this I had made a separate incision laterally through the   skin and IT band and placed a bone  hook around the medial neck to reduce the   neck.  I held this reduction.  We then placed the interlocking sleeve through   this and then inserted a guidewire into the near center-center position of the   femoral head.  The cannulated drill was placed over this and then we placed a   lag screw.  The set screw was then tightened.  The jig was removed.    Fluoroscopic images proximally showed good reduction and implant placement.    Nail was locked distally with a single static interlocking screw after stab   incision and predrilling.  The wounds were irrigated.  IT band closed with 2-0   Vicryl and skin incisions were closed with 2-0 Vicryl and staples.  Dressings   were applied.  The patient was extubated and taken to the recovery room in   stable condition.    POSTOPERATIVE PLAN:  1.  Intravenous antibiotics for 24 hours.  2.  DVT prophylaxis.  3.  Weightbearing as tolerated.  4.  Ongoing preoperative medical management per hospitalist.       ____________________________________     MD MOHAMUD WILLIAMSON / ELIEZER    DD:  10/23/2018 16:27:49  DT:  10/23/2018 17:16:03    D#:  [id number]  Job#:  [id number]

## 2018-10-25 LAB
ALBUMIN SERPL BCP-MCNC: 2.7 G/DL (ref 3.2–4.9)
ALBUMIN/GLOB SERPL: 1.5 G/DL
ALP SERPL-CCNC: 43 U/L (ref 30–99)
ALT SERPL-CCNC: 7 U/L (ref 2–50)
ANION GAP SERPL CALC-SCNC: 6 MMOL/L (ref 0–11.9)
AST SERPL-CCNC: 16 U/L (ref 12–45)
BILIRUB SERPL-MCNC: 0.6 MG/DL (ref 0.1–1.5)
BUN SERPL-MCNC: 10 MG/DL (ref 8–22)
CALCIUM SERPL-MCNC: 8.3 MG/DL (ref 8.5–10.5)
CHLORIDE SERPL-SCNC: 106 MMOL/L (ref 96–112)
CO2 SERPL-SCNC: 24 MMOL/L (ref 20–33)
CREAT SERPL-MCNC: 0.6 MG/DL (ref 0.5–1.4)
ERYTHROCYTE [DISTWIDTH] IN BLOOD BY AUTOMATED COUNT: 46.6 FL (ref 35.9–50)
GLOBULIN SER CALC-MCNC: 1.8 G/DL (ref 1.9–3.5)
GLUCOSE SERPL-MCNC: 119 MG/DL (ref 65–99)
HCT VFR BLD AUTO: 23.5 % (ref 37–47)
HGB BLD-MCNC: 7.8 G/DL (ref 12–16)
MCH RBC QN AUTO: 32.2 PG (ref 27–33)
MCHC RBC AUTO-ENTMCNC: 33.2 G/DL (ref 33.6–35)
MCV RBC AUTO: 97.1 FL (ref 81.4–97.8)
PLATELET # BLD AUTO: 165 K/UL (ref 164–446)
PMV BLD AUTO: 10.1 FL (ref 9–12.9)
POTASSIUM SERPL-SCNC: 3.6 MMOL/L (ref 3.6–5.5)
PROT SERPL-MCNC: 4.5 G/DL (ref 6–8.2)
RBC # BLD AUTO: 2.42 M/UL (ref 4.2–5.4)
SODIUM SERPL-SCNC: 136 MMOL/L (ref 135–145)
WBC # BLD AUTO: 7 K/UL (ref 4.8–10.8)

## 2018-10-25 PROCEDURE — A9270 NON-COVERED ITEM OR SERVICE: HCPCS | Performed by: STUDENT IN AN ORGANIZED HEALTH CARE EDUCATION/TRAINING PROGRAM

## 2018-10-25 PROCEDURE — 700111 HCHG RX REV CODE 636 W/ 250 OVERRIDE (IP): Performed by: STUDENT IN AN ORGANIZED HEALTH CARE EDUCATION/TRAINING PROGRAM

## 2018-10-25 PROCEDURE — 85027 COMPLETE CBC AUTOMATED: CPT

## 2018-10-25 PROCEDURE — 36415 COLL VENOUS BLD VENIPUNCTURE: CPT

## 2018-10-25 PROCEDURE — A9270 NON-COVERED ITEM OR SERVICE: HCPCS | Performed by: INTERNAL MEDICINE

## 2018-10-25 PROCEDURE — 97110 THERAPEUTIC EXERCISES: CPT

## 2018-10-25 PROCEDURE — 700102 HCHG RX REV CODE 250 W/ 637 OVERRIDE(OP): Performed by: STUDENT IN AN ORGANIZED HEALTH CARE EDUCATION/TRAINING PROGRAM

## 2018-10-25 PROCEDURE — 99232 SBSQ HOSP IP/OBS MODERATE 35: CPT | Mod: GC | Performed by: INTERNAL MEDICINE

## 2018-10-25 PROCEDURE — 700111 HCHG RX REV CODE 636 W/ 250 OVERRIDE (IP): Performed by: INTERNAL MEDICINE

## 2018-10-25 PROCEDURE — 97530 THERAPEUTIC ACTIVITIES: CPT

## 2018-10-25 PROCEDURE — 700105 HCHG RX REV CODE 258: Performed by: STUDENT IN AN ORGANIZED HEALTH CARE EDUCATION/TRAINING PROGRAM

## 2018-10-25 PROCEDURE — 700102 HCHG RX REV CODE 250 W/ 637 OVERRIDE(OP): Performed by: INTERNAL MEDICINE

## 2018-10-25 PROCEDURE — 97535 SELF CARE MNGMENT TRAINING: CPT

## 2018-10-25 PROCEDURE — 80053 COMPREHEN METABOLIC PANEL: CPT

## 2018-10-25 PROCEDURE — 770020 HCHG ROOM/CARE - TELE (206)

## 2018-10-25 RX ORDER — BUPROPION HYDROCHLORIDE 100 MG/1
100 TABLET, EXTENDED RELEASE ORAL DAILY
Status: DISCONTINUED | OUTPATIENT
Start: 2018-10-26 | End: 2018-10-30 | Stop reason: HOSPADM

## 2018-10-25 RX ADMIN — SODIUM CHLORIDE: 9 INJECTION, SOLUTION INTRAVENOUS at 15:51

## 2018-10-25 RX ADMIN — HYDROCODONE BITARTRATE AND ACETAMINOPHEN 1 TABLET: 10; 325 TABLET ORAL at 13:03

## 2018-10-25 RX ADMIN — POLYETHYLENE GLYCOL 3350 1 PACKET: 17 POWDER, FOR SOLUTION ORAL at 18:13

## 2018-10-25 RX ADMIN — BUPROPION HYDROCHLORIDE 150 MG: 150 TABLET, EXTENDED RELEASE ORAL at 05:24

## 2018-10-25 RX ADMIN — SENNOSIDES AND DOCUSATE SODIUM 2 TABLET: 8.6; 5 TABLET ORAL at 05:22

## 2018-10-25 RX ADMIN — CITALOPRAM HYDROBROMIDE 20 MG: 20 TABLET ORAL at 05:23

## 2018-10-25 RX ADMIN — ENOXAPARIN SODIUM 40 MG: 100 INJECTION SUBCUTANEOUS at 05:22

## 2018-10-25 RX ADMIN — HYDROCODONE BITARTRATE AND ACETAMINOPHEN 1 TABLET: 10; 325 TABLET ORAL at 05:23

## 2018-10-25 RX ADMIN — MORPHINE SULFATE 5 MG: 10 INJECTION INTRAVENOUS at 10:22

## 2018-10-25 RX ADMIN — SENNOSIDES AND DOCUSATE SODIUM 2 TABLET: 8.6; 5 TABLET ORAL at 18:13

## 2018-10-25 RX ADMIN — LISINOPRIL 20 MG: 20 TABLET ORAL at 05:23

## 2018-10-25 RX ADMIN — OMEPRAZOLE 40 MG: 20 CAPSULE, DELAYED RELEASE ORAL at 05:23

## 2018-10-25 RX ADMIN — ATORVASTATIN CALCIUM 40 MG: 40 TABLET, FILM COATED ORAL at 18:14

## 2018-10-25 RX ADMIN — MORPHINE SULFATE 5 MG: 10 INJECTION INTRAVENOUS at 15:50

## 2018-10-25 RX ADMIN — SODIUM CHLORIDE: 9 INJECTION, SOLUTION INTRAVENOUS at 05:22

## 2018-10-25 RX ADMIN — DONEPEZIL HYDROCHLORIDE 5 MG: 5 TABLET, FILM COATED ORAL at 18:14

## 2018-10-25 RX ADMIN — HYDROCODONE BITARTRATE AND ACETAMINOPHEN 1 TABLET: 10; 325 TABLET ORAL at 21:29

## 2018-10-25 RX ADMIN — METOPROLOL SUCCINATE 12.5 MG: 25 TABLET, EXTENDED RELEASE ORAL at 05:23

## 2018-10-25 ASSESSMENT — GAIT ASSESSMENTS: GAIT LEVEL OF ASSIST: UNABLE TO PARTICIPATE

## 2018-10-25 ASSESSMENT — PAIN SCALES - GENERAL
PAINLEVEL_OUTOF10: ASSUMED PAIN PRESENT
PAINLEVEL_OUTOF10: 7
PAINLEVEL_OUTOF10: 3
PAINLEVEL_OUTOF10: ASSUMED PAIN PRESENT
PAINLEVEL_OUTOF10: 7
PAINLEVEL_OUTOF10: 8
PAINLEVEL_OUTOF10: ASSUMED PAIN PRESENT
PAINLEVEL_OUTOF10: ASSUMED PAIN PRESENT
PAINLEVEL_OUTOF10: 6
PAINLEVEL_OUTOF10: ASSUMED PAIN PRESENT
PAINLEVEL_OUTOF10: ASSUMED PAIN PRESENT

## 2018-10-25 ASSESSMENT — COGNITIVE AND FUNCTIONAL STATUS - GENERAL
DRESSING REGULAR UPPER BODY CLOTHING: A LOT
SUGGESTED CMS G CODE MODIFIER MOBILITY: CM
MOVING TO AND FROM BED TO CHAIR: UNABLE
HELP NEEDED FOR BATHING: A LOT
CLIMB 3 TO 5 STEPS WITH RAILING: TOTAL
MOVING FROM LYING ON BACK TO SITTING ON SIDE OF FLAT BED: UNABLE
TURNING FROM BACK TO SIDE WHILE IN FLAT BAD: UNABLE
DRESSING REGULAR LOWER BODY CLOTHING: TOTAL
TOILETING: A LOT
STANDING UP FROM CHAIR USING ARMS: A LOT
EATING MEALS: A LITTLE
WALKING IN HOSPITAL ROOM: TOTAL
SUGGESTED CMS G CODE MODIFIER DAILY ACTIVITY: CL
MOBILITY SCORE: 7
PERSONAL GROOMING: A LITTLE
DAILY ACTIVITIY SCORE: 13

## 2018-10-25 NOTE — PROGRESS NOTES
2 RN skin check with Ivette, RN     Right elbow red and blanching   Sacrum red, slow to jevon. Mepilex applied  Bilateral heels red, blanching. Floated on pillow  3 surgical incision sites to Left hip and thigh. Dressings in place, CDI   Scab below left knee   Waffle mattress placed

## 2018-10-25 NOTE — PROGRESS NOTES
Assumed care at 0715. Bedside report received from Night RN Aubree. Patient's chart and MAR reviewed. 12 hour chart check complete. Patient was updated on plan of care for the day. Questions answered and concerns addressed.  Pt denies any additional needs at this time. White board updated. Call light, phone and personal belongings within reach. Bed alarm on and working appropriately. Vital signs stable

## 2018-10-25 NOTE — PROGRESS NOTES
2 RN skin assessment completed with Brenna NELSON.  L hip and outer thigh incisions covered with dry sterile dressing.  Abrasion left upper back near shoulder.  Sacrum red and slow to jevon, mepilex applied.  Small dried scabs between toes bilat feet.  Small scab on left knee.

## 2018-10-25 NOTE — THERAPY
"Physical Therapy Evaluation completed.   Bed Mobility:  Supine to Sit: Maximal Assist  Transfers: Sit to Stand: Maximal Assist; essentially dependent for stand pivot transfer  Gait: Level Of Assist: Unable to Participate     Plan of Care: Will benefit from Physical Therapy 4 times per week  Discharge Recommendations: Equipment: Will Continue to Assess for Equipment Needs. See below    Pt progressing slowly as expected. She is able to demonstrate bed mobility with max A, sit<>stands with max A and essentially dependent transfer. As prior her baseline cognitive deficits are exacerbating her functional impairements as well and limited ability for appropriate compensatory strategies. Expressed concerns to nursing as anticipate that her current behavior (appears high anxiety) will be a large limiting factor with progression with PT (and may benefit from psych involvment for assist with progression). She will benefit from continued skilled acute PT while here with recommendation of continued skilled PT/placement prior to medical dc to home given current objective findings, age, current co-morbidities (including IM nailing), and limited ability of social supports to assist wtih current level needed. Anticiapte as pain/fear of pain reduces she will progress well with increased OOB activity.     See \"Rehab Therapy-Acute\" Patient Summary Report for complete documentation.     "

## 2018-10-25 NOTE — PROGRESS NOTES
"       Internal Medicine Interval Note  Note Author: Lisa Horn M.D.     Name Madhavi Montiel 1935   Age/Sex 83 y.o. female   MRN [id number]   Code Status FULL     After 5PM or if no immediate response to page, please call for cross-coverage  Attending/Team: Dr. Brenda Morales/Vladimir See Patient List for primary contact information  Call [phone] to page    1st Call - Day Intern (R1):   Dr. Lisa Horn  2nd Call - Day Sr. Resident (R2/R3):   Dr. Ahsan Stallings      Reason for interval visit  (Principal Problem)   Intertrochanteric fracture of left femur, closed, initial encounter (HCC)    Interval Problem Daily Status Update  (24 hours, problem oriented, brief subjective history, new lab/imaging data pertinent to that problem)   No acute overnight events.  Patient complains of pain at the low back and left hip, and at the cervical area.  Patient became teary-eyed and kept saying \" I don't care\" when I asked her to turn the light on and off.  She states she has no appetite to eat, and had not touched her breakfast this morning. She is alert and oriented self, somewhat to place.  On physical exam she has some tenderness to palpation at the posterior cervical area, her left hip is dressed with some reddish brown staining at her bandage, otherwise the site looks clean.  On blood work, patient's hemoglobin dropped slightly however no signs of active bleed.  Her calcium is normal when corrected for low albumin.  We will try to optimize patient's nutrition and physical therapy while inpatient.     Review of Systems   Unable to perform ROS: Dementia     Disposition/Barriers to discharge:   Guarded  Placement to SNF    Consultants/Specialty  Dr. River Castillo/orthopedic surgery  PCP: No primary care provider on file.    Quality Measures  Quality-Core Measures   Reviewed items::  Labs reviewed and Medications reviewed  Mcmanus catheter::  One or Two Days Post Surgery (Day of Surgery being Day 0)  DVT " prophylaxis pharmacological::  Enoxaparin (Lovenox)  DVT prophylaxis - mechanical:  SCDs  Ulcer Prophylaxis::  Not indicated    Physical Exam     Vitals:    10/25/18 0023 10/25/18 0400 10/25/18 0800 10/25/18 1156   BP: 113/53 127/53 155/68 158/68   Pulse: 78 72 78 80   Resp: 16 16 14 16   Temp: 36.9 °C (98.5 °F) 37.8 °C (100 °F) 37.3 °C (99.2 °F) 37.3 °C (99.2 °F)   SpO2: 90% 90% 92% 91%   Weight:       Height:         Body mass index is 21.53 kg/m². Weight: 53.4 kg (117 lb 11.6 oz)  Oxygen Therapy:  Pulse Oximetry: 91 %, O2 (LPM): 2, O2 Delivery: Silicone Nasal Cannula    Physical Exam   Constitutional: No distress.   Frail-appearing elderly female   HENT:   Head: Normocephalic and atraumatic.   Mouth/Throat: Oropharynx is clear and moist.   Eyes: EOM are normal.   Neck: Muscular tenderness present.   Cardiovascular: Normal rate, regular rhythm, normal heart sounds and intact distal pulses.    Pulmonary/Chest: Effort normal and breath sounds normal. She has no wheezes. She has no rales.   Abdominal: Soft. She exhibits no distension. There is no tenderness.   Musculoskeletal:   Left hip with dressings   Lymphadenopathy:     She has no cervical adenopathy.   Neurological: She is alert.   Oriented to self   Skin: Skin is warm and dry.   Nursing note and vitals reviewed.    Assessment/Plan     * Intertrochanteric fracture of left femur, closed, initial encounter (HCC)- (present on admission)   Overview    GLF (10/22/18), patient states she tripped over her dog in the kitchen and fell  Her son's girlfriend found her and called EMS, patient arrived to [facility] ED via flight care  During flight, Ems witnessed a vasovagal syncopal episode.   XR showed likely communited (L) intertrochanteric fracture     Assessment & Plan    Surgery was done yesterday with intramedullary nailing of left hip, with no complications.  She has  postoperative pain and back pain.  Responded to morphine and Norco.  No signs of bleeding or  ecchymosis.    Plan  -Continue home Norco  mg PO q6h PRN for moderate pain  -Morphine 5 mg IV q4h PRN for severe pain  -PT -recommended 3 times a week PT and for placement to skilled nursing nursing facility following discharge  -OT-recommended 4 times a week OT and for placement to skilled nursing facility following discharge  -Continue DVT prophylaxis        Hypokalemia- (present on admission)   Assessment & Plan    Repleted, serum potassium 3.6 today.        Aortic systolic murmur on examination- (present on admission)   Assessment & Plan    Ejection systolic murmur at aortic area radiated to both carotids on examination, aortic stenosis murmur.  Records were obtained from her PCP who she last saw her in April 2018 with referral to a cardiologist, however no records of actually seeing the cardiologist.  Unable to obtain a reliable history from the patient.   No cardiovascular symptoms at current time.  Echo done with no pertinent findings EF: 65%    Plan  -May discontinue cardiac monitoring        Dyslipidemia- (present on admission)   Assessment & Plan    Chronic condition    Continue atorvastatin        Normocytic normochromic anemia- (present on admission)   Assessment & Plan    Hemoglobin was 12 on admission, decreased to 8.2 today.  Most likely due to blood loss during operation on IV fluid.  Asymptomatic.  No signs of active bleeding    Plan    Continue to monitor hemodynamic status  Transfuse for Hgb < 7.0        Essential hypertension, benign- (present on admission)   Assessment & Plan    Her blood pressure is stable currently.  Restarted on her lisinopril, metoprolol was held due to bradycardia.    Plan    Continue lisinopril and metoprolol

## 2018-10-25 NOTE — PROGRESS NOTES
"   Orthopaedic Progress Note    Interval changes:  Patient doing well  Cleared for DC to SNF by ortho pending medicine clearance    ROS - Patient denies any new issues.  Pain well controlled.    Blood pressure 158/68, pulse 80, temperature 37.3 °C (99.2 °F), resp. rate 16, height 1.575 m (5' 2\"), weight 53.4 kg (117 lb 11.6 oz), SpO2 91 %, not currently breastfeeding.      Patient seen and examined  No acute distress  Breathing non labored  RRR  Left hip Surgical dressings are clean, dry, and intact. Patient clearly fires tibialis anterior, EHL, and gastrocnemius/soleus. Sensation is intact to light touch throughout superficial peroneal, deep peroneal, tibial, saphenous, and sural nerve distributions. Strong and palpable 2+ dorsalis pedis and posterior tibial pulses with capillary refill less than 2 seconds. No lower leg tenderness or discomfort.       Recent Labs      10/23/18   0617  10/24/18   0238  10/25/18   0046   WBC  8.2  6.1  7.0   RBC  3.28*  2.56*  2.42*   HEMOGLOBIN  10.3*  8.2*  7.8*   HEMATOCRIT  31.5*  25.2*  23.5*   MCV  96.0  98.4*  97.1   MCH  31.4  32.0  32.2   MCHC  32.7*  32.5*  33.2*   RDW  45.5  46.6  46.6   PLATELETCT  214  159*  165   MPV  10.0  10.1  10.1       Active Hospital Problems    Diagnosis   • Intertrochanteric fracture of left femur, closed, initial encounter ([facility]) [S72.872A]     Priority: High     GLF (10/22/18), patient states she tripped over her dog in the kitchen and fell  Her son's girlfriend found her and called EMS, patient arrived to [facility] ED via flight care  During flight, Ems witnessed a vasovagal syncopal episode.   XR showed likely communited (L) intertrochanteric fracture     • Hypokalemia [E87.6]     Priority: Medium   • Essential hypertension, benign [I10]     Priority: Low   • Normocytic normochromic anemia [D64.9]     Priority: Low   • Dyslipidemia [E78.5]     Priority: Low   • Aortic systolic murmur on examination [I35.8]     Priority: Low "       Assessment/Plan:  Patient doing well  POD#2 S/P Intramedullary nailing of left hip  Wt bearing status - WBAT  Wound care/Drains - dressing CDI  Future Procedures - none planend   Lovenox: Start 10/24, Duration-until ambulatory > 150'  Sutures/Staples out- 10-14 days post operatively  PT/OT-initiated  Antibiotics: completed  DVT Prophylaxis- TEDS/SCDs/Foot pumps  Mcmanus-none  Case Coordination for Discharge Planning - Disposition SNF

## 2018-10-25 NOTE — CARE PLAN
Problem: Infection  Goal: Will remain free from infection  Outcome: PROGRESSING AS EXPECTED  Assessed for signs and symptoms of infection. Standard precautions implemented. Education provided to patient and visitors about hand hygiene.      Problem: Skin Integrity  Goal: Risk for impaired skin integrity will decrease  Outcome: PROGRESSING AS EXPECTED  Q2hr hour turns encouraged, frequent cleansing of skin, skin protectant used. Frequent monitoring of skin integrity.

## 2018-10-25 NOTE — DIETARY
"Nutrition services: Day 2 of admit.  Madhavi Montiel is a 83 y.o. female with admitting DX of femur fracture and left hip fracture.  Consult received for poor PO intake.  Pt appears thin.  RD visited pt at bedside to discuss appetite, PO intake, and wt history.  Pt was sound asleep.  RD called out to pt - pt did not arouse at this time.  RD to follow-up as appropriate and continue to monitor to ensure pt is receiving adequate nutrition.     Assessment:  Height: 157.5 cm (5' 2\")  Weight: 53.4 kg (117 lb 11.6 oz)  Body mass index is 21.53 kg/m². - WNL.  Diet/Intake: Regular.  Per chart, pt has been consistently consuming <25% of meals.    Evaluation:   1. Pt noted with essential HTN, normocytic normochromic anemia, and dyslipidemia.   2. Pt has a hx of Alzheimer's disease, dementia, and back pain.  3. Labs: Glucose: 119.  Other labs and meds reviewed.  4. Pt would benefit from nutritional supplements three times a day to to optimize intake.  5. Last BM: PTA.    Recommendations/Plan:  1. Boost VHC x 2, Magic Cups x 2.  2. Encourage intake of meals, snacks, and supplements.  3. Consider Marinol.  4. Document intake of all meals, snacks, and supplements as % taken in ADL's to provide interdisciplinary communication across all shifts.   5. Monitor weight.  6. Nutrition rep will continue to see patient for ongoing meal and snack preferences.     RD following.          "

## 2018-10-25 NOTE — PROGRESS NOTES
Bedside report received by day LESLIE Barriga. Patient sitting up in bed watching TV at this time. POC discussed, verbalized some understanding, A&O self and time. No immediate concerns for patient at this time. All safety measures in place.

## 2018-10-26 PROBLEM — F03.90 DEMENTIA (HCC): Status: ACTIVE | Noted: 2018-10-26

## 2018-10-26 PROBLEM — E87.6 HYPOKALEMIA: Status: RESOLVED | Noted: 2018-10-23 | Resolved: 2018-10-26

## 2018-10-26 PROCEDURE — 770006 HCHG ROOM/CARE - MED/SURG/GYN SEMI*

## 2018-10-26 PROCEDURE — 700102 HCHG RX REV CODE 250 W/ 637 OVERRIDE(OP): Performed by: STUDENT IN AN ORGANIZED HEALTH CARE EDUCATION/TRAINING PROGRAM

## 2018-10-26 PROCEDURE — 700111 HCHG RX REV CODE 636 W/ 250 OVERRIDE (IP): Performed by: INTERNAL MEDICINE

## 2018-10-26 PROCEDURE — A9270 NON-COVERED ITEM OR SERVICE: HCPCS | Performed by: STUDENT IN AN ORGANIZED HEALTH CARE EDUCATION/TRAINING PROGRAM

## 2018-10-26 PROCEDURE — 700102 HCHG RX REV CODE 250 W/ 637 OVERRIDE(OP): Performed by: INTERNAL MEDICINE

## 2018-10-26 PROCEDURE — A9270 NON-COVERED ITEM OR SERVICE: HCPCS | Performed by: INTERNAL MEDICINE

## 2018-10-26 PROCEDURE — 99232 SBSQ HOSP IP/OBS MODERATE 35: CPT | Mod: GC | Performed by: INTERNAL MEDICINE

## 2018-10-26 PROCEDURE — 700105 HCHG RX REV CODE 258: Performed by: STUDENT IN AN ORGANIZED HEALTH CARE EDUCATION/TRAINING PROGRAM

## 2018-10-26 PROCEDURE — 700111 HCHG RX REV CODE 636 W/ 250 OVERRIDE (IP): Performed by: STUDENT IN AN ORGANIZED HEALTH CARE EDUCATION/TRAINING PROGRAM

## 2018-10-26 RX ORDER — HYDRALAZINE HYDROCHLORIDE 20 MG/ML
10 INJECTION INTRAMUSCULAR; INTRAVENOUS EVERY 6 HOURS PRN
Status: DISCONTINUED | OUTPATIENT
Start: 2018-10-26 | End: 2018-10-30 | Stop reason: HOSPADM

## 2018-10-26 RX ORDER — METOPROLOL SUCCINATE 25 MG/1
25 TABLET, EXTENDED RELEASE ORAL
Status: DISCONTINUED | OUTPATIENT
Start: 2018-10-27 | End: 2018-10-30 | Stop reason: HOSPADM

## 2018-10-26 RX ADMIN — HYDROCODONE BITARTRATE AND ACETAMINOPHEN 1 TABLET: 10; 325 TABLET ORAL at 05:29

## 2018-10-26 RX ADMIN — SENNOSIDES AND DOCUSATE SODIUM 2 TABLET: 8.6; 5 TABLET ORAL at 05:28

## 2018-10-26 RX ADMIN — OMEPRAZOLE 40 MG: 20 CAPSULE, DELAYED RELEASE ORAL at 05:28

## 2018-10-26 RX ADMIN — MORPHINE SULFATE 5 MG: 10 INJECTION INTRAVENOUS at 00:09

## 2018-10-26 RX ADMIN — HYDROCODONE BITARTRATE AND ACETAMINOPHEN 1 TABLET: 10; 325 TABLET ORAL at 18:31

## 2018-10-26 RX ADMIN — SENNOSIDES AND DOCUSATE SODIUM 2 TABLET: 8.6; 5 TABLET ORAL at 17:11

## 2018-10-26 RX ADMIN — MORPHINE SULFATE 5 MG: 10 INJECTION INTRAVENOUS at 15:16

## 2018-10-26 RX ADMIN — SODIUM CHLORIDE: 9 INJECTION, SOLUTION INTRAVENOUS at 05:34

## 2018-10-26 RX ADMIN — LISINOPRIL 20 MG: 20 TABLET ORAL at 05:28

## 2018-10-26 RX ADMIN — MORPHINE SULFATE 5 MG: 10 INJECTION INTRAVENOUS at 10:39

## 2018-10-26 RX ADMIN — CITALOPRAM HYDROBROMIDE 20 MG: 20 TABLET ORAL at 05:28

## 2018-10-26 RX ADMIN — POLYETHYLENE GLYCOL 3350 1 PACKET: 17 POWDER, FOR SOLUTION ORAL at 17:11

## 2018-10-26 RX ADMIN — HYDROCODONE BITARTRATE AND ACETAMINOPHEN 1 TABLET: 10; 325 TABLET ORAL at 12:37

## 2018-10-26 RX ADMIN — ENOXAPARIN SODIUM 40 MG: 100 INJECTION SUBCUTANEOUS at 05:29

## 2018-10-26 RX ADMIN — METOPROLOL SUCCINATE 12.5 MG: 25 TABLET, EXTENDED RELEASE ORAL at 05:29

## 2018-10-26 RX ADMIN — MORPHINE SULFATE 5 MG: 10 INJECTION INTRAVENOUS at 20:56

## 2018-10-26 RX ADMIN — ATORVASTATIN CALCIUM 40 MG: 40 TABLET, FILM COATED ORAL at 17:11

## 2018-10-26 RX ADMIN — DONEPEZIL HYDROCHLORIDE 5 MG: 5 TABLET, FILM COATED ORAL at 17:11

## 2018-10-26 RX ADMIN — BUPROPION HYDROCHLORIDE 100 MG: 100 TABLET, FILM COATED, EXTENDED RELEASE ORAL at 05:28

## 2018-10-26 RX ADMIN — HYDRALAZINE HYDROCHLORIDE 10 MG: 20 INJECTION INTRAMUSCULAR; INTRAVENOUS at 17:10

## 2018-10-26 ASSESSMENT — PAIN SCALES - GENERAL
PAINLEVEL_OUTOF10: 8
PAINLEVEL_OUTOF10: 8
PAINLEVEL_OUTOF10: ASSUMED PAIN PRESENT
PAINLEVEL_OUTOF10: 8

## 2018-10-26 NOTE — DISCHARGE PLANNING
Anticipated Discharge Disposition: D/C to SNF    Action: LSW completed a PASRR for pt ([id number]).    Barriers to Discharge: None.    Plan: LSW to contact pt's son, Mayito, once SNFs have made decisions of acceptance.

## 2018-10-26 NOTE — PROGRESS NOTES
Bedside report received by day LESLIE Mtz. Patient sitting up in bed watching TV at this time. POC discussed, verbalized understanding. No immediate concerns for patient at this time. All safety measures in place.

## 2018-10-26 NOTE — PROGRESS NOTES
Internal Medicine Interval Note  Note Author: Lisa Horn M.D.     Name Madhavi Montiel 1935   Age/Sex 83 y.o. female   MRN [id number]   Code Status FULL     After 5PM or if no immediate response to page, please call for cross-coverage  Attending/Team: Dr. Brenda oMrales/Vladimir See Patient List for primary contact information  Call [phone] to page    1st Call - Day Intern (R1):   Dr. Lisa Horn  2nd Call - Day Sr. Resident (R2/R3):   Dr. Ahsan Stallings      Reason for interval visit  (Principal Problem)   Intertrochanteric fracture of left femur, closed, initial encounter (HCC)    Interval Problem Daily Status Update  (24 hours, problem oriented, brief subjective history, new lab/imaging data pertinent to that problem)   No acute overnight events. Patient is doing well this morning, she states she has some back pain, but does have an appetite. On physical exam, patient is alert, oriented to self, unable to recall why she is in the hospital. No change in blood stain at incision dressing. PT/OT saw the patient yesterday who recommended 3-4x weekly sessions and skilled nursing placement. Spoke to care coordination regarding Madhavi's disposition for skilled nursing. They will try to make contact with her family to determine plan of action.     Review of Systems   Unable to perform ROS: Dementia     Disposition/Barriers to discharge:   Guarded, pending placement to SNF     Consultants/Specialty  Dr. River Castillo/Orthopedic Surgery   PCP: No primary care provider on file.    Quality Measures  Quality-Core Measures   Reviewed items::  Labs reviewed and Medications reviewed  Mcmanus catheter::  One or Two Days Post Surgery (Day of Surgery being Day 0)  DVT prophylaxis pharmacological::  Enoxaparin (Lovenox)  DVT prophylaxis - mechanical:  SCDs  Ulcer Prophylaxis::  Not indicated    Physical Exam     Vitals:    10/25/18 2010 10/26/18 0020 10/26/18 0420 10/26/18 0825   BP: 132/68 157/70 (!) 178/75  150/71   Pulse: 72 84 85 64   Resp: 16 16 17 16   Temp: 36.6 °C (97.9 °F) 36.8 °C (98.3 °F) 36.8 °C (98.3 °F) 36.7 °C (98 °F)   SpO2: 91% 90% 92% 91%   Weight: 55.6 kg (122 lb 9.2 oz)      Height:         Body mass index is 22.42 kg/m². Weight: 55.6 kg (122 lb 9.2 oz)  Oxygen Therapy:  Pulse Oximetry: 91 %, O2 (LPM): 2, O2 Delivery: Silicone Nasal Cannula    Physical Exam   Constitutional: No distress.   Elderly frail female    HENT:   Head: Normocephalic and atraumatic.   Mouth/Throat: Oropharynx is clear and moist. No oropharyngeal exudate.   Neck: Normal range of motion.   Cardiovascular: Normal rate, regular rhythm, normal heart sounds and intact distal pulses.    Pulmonary/Chest: Effort normal and breath sounds normal. She has no wheezes. She has no rales.   Abdominal: Soft. Bowel sounds are normal. There is no tenderness. There is no rebound.   Musculoskeletal: She exhibits no edema.   Lymphadenopathy:     She has no cervical adenopathy.   Neurological: She is alert.   Oriented to self    Skin: Skin is warm and dry.   Psychiatric: Affect normal.   Vitals reviewed.    Assessment/Plan     * Intertrochanteric fracture of left femur, closed, initial encounter (HCC)- (present on admission)   Overview    [facility] (10/22/18), patient states she tripped over her dog in the kitchen and fell  Her son's girlfriend found her and called EMS, patient arrived to [facility] ED via flight care  During flight, Ems witnessed a vasovagal syncopal episode.   XR showed likely communited (L) intertrochanteric fracture     Assessment & Plan    Surgery was done yesterday with intramedullary nailing of left hip, with no complications.  She has  postoperative pain and back pain.  Responded to morphine and Norco.  No signs of bleeding or ecchymosis.    Plan  -Continue home pain management   -PT -recommended 3 times a week PT and for placement to skilled nursing nursing facility following discharge  -OT-recommended 4 times a week OT and for  placement to skilled nursing facility following discharge  -Continue DVT prophylaxis        Dementia   Assessment & Plan    -Patient has history of dementia, beginning ~2013 when her  passed away.   -Questionable history from patient, unsure of living situation   -Currently on aricept 5mg         Aortic systolic murmur on examination- (present on admission)   Assessment & Plan    Ejection systolic murmur at aortic area radiated to both carotids on examination, aortic stenosis murmur.  Records were obtained from her PCP who she last saw her in April 2018 with referral to a cardiologist, however no records of actually seeing the cardiologist.  Unable to obtain a reliable history from the patient.   No cardiovascular symptoms at current time.  Echo done with no pertinent findings EF: 65%  -Observe         Dyslipidemia- (present on admission)   Assessment & Plan    Chronic condition    Continue atorvastatin        Normocytic normochromic anemia- (present on admission)   Assessment & Plan    Hemoglobin was 12 on admission, decreased to 8.2 today.  Most likely due to blood loss during operation on IV fluid.  Asymptomatic.  No signs of active bleeding    Plan    Continue to monitor hemodynamic status  Transfuse for Hgb < 7.0        Essential hypertension, benign- (present on admission)   Assessment & Plan    Well-controlled  -Continue lisinopril and metoprolol

## 2018-10-26 NOTE — CARE PLAN
Problem: Infection  Goal: Will remain free from infection  Outcome: PROGRESSING AS EXPECTED  Assessed for signs and symptoms of infection. Standard precautions implemented. Education provided to patient and visitors about hand hygiene.      Problem: Pain Management  Goal: Pain level will decrease to patient's comfort goal  Outcome: PROGRESSING AS EXPECTED  Pt assessed for pain Q4h and medicated PRN. Pt instructed to notify RN of any new or increasing pain to prevent it from becoming intolerable. Verbalized understanding.

## 2018-10-26 NOTE — PROGRESS NOTES
2 RN skin check completed LESLIE Sheppard     Sacrum red and slow to jevon, new mepilex applied   Bilat heels red/boggy/blanching, floated on pillow   3 surgical sites to Left hip and thigh, dressings in place   Waffle mattress in place

## 2018-10-26 NOTE — PROGRESS NOTES
2 RN skin assessment completed with Bautista NELSON.    Left leg, 3 surgical incisions with island dressing in place, CDI.  Scab on left knee  Bilateral heels red and blanching, floated on pillow.  Pt refused to turn to have back sided assessed, Pt educated on importance and still refused.  (Primary RN looked at it earlier when turning Pt. Sacrum was red and slow to jevon. New mepilex was applied)  Waffle mattress in place.

## 2018-10-26 NOTE — DISCHARGE PLANNING
Agency/Facility Name: [facility]  Spoke To: Katlin  Outcome: Referral pending, need updated DC plan, no notes in chart.

## 2018-10-26 NOTE — DISCHARGE PLANNING
Anticipated Discharge Disposition: D/C to SNF    Action: LSW contacted pt's son, Mayito (ph#[phone]), to discuss d/c planning. Mayito answered the phone and indicated that he and his mom live in [place], but he works at [employer]. Mayito would like referrals sent to local SNFs as this works better for him given his employment location.    Mayito works [employer], he indicates he can be contacted by leaving a  or by e-mail at [email or web address].     Barriers to Discharge: None.    Plan: LSW to contact pt's son, Mayito, once SNFs have made decisions of acceptance.

## 2018-10-26 NOTE — DISCHARGE PLANNING
Received Choice form at 2163  Agency/Facility Name: [facility], [facility], [facility], [place], [place], [place], [facility], [facility].   Referral sent per Choice form at 1510

## 2018-10-26 NOTE — DISCHARGE PLANNING
Anticipated Discharge Disposition: D/C to SNF    Action: LSW informed by UNSRINATH JACKSON that pt will require SNF, but she has dementia and not competent to make decisions. Pt reportedly lives with her son, Mayito, but [facility] MD has been unable to reach him. It was indicated that when [facility] MD called the number on pt's facesheet, that a woman who doesn't know Mayito or the pt answered.    LSW looked through pt's notes and found that surgeon called number listed on facesheet and was able to reach pt's son. LSW called the number and reached a nondescript VM greeting. No VM left due to above.    Barriers to Discharge: Pt has dementia, inability to reach [facility].    Plan: LSW will request a [facility] search be completed.

## 2018-10-27 ENCOUNTER — APPOINTMENT (OUTPATIENT)
Dept: RADIOLOGY | Facility: MEDICAL CENTER | Age: 83
DRG: 481 | End: 2018-10-27
Attending: STUDENT IN AN ORGANIZED HEALTH CARE EDUCATION/TRAINING PROGRAM
Payer: MEDICARE

## 2018-10-27 LAB
ANION GAP SERPL CALC-SCNC: 12 MMOL/L (ref 0–11.9)
ANION GAP SERPL CALC-SCNC: 15 MMOL/L (ref 0–11.9)
BUN SERPL-MCNC: 5 MG/DL (ref 8–22)
BUN SERPL-MCNC: 6 MG/DL (ref 8–22)
CALCIUM SERPL-MCNC: 8.6 MG/DL (ref 8.5–10.5)
CALCIUM SERPL-MCNC: 9.3 MG/DL (ref 8.5–10.5)
CHLORIDE SERPL-SCNC: 101 MMOL/L (ref 96–112)
CHLORIDE SERPL-SCNC: 103 MMOL/L (ref 96–112)
CO2 SERPL-SCNC: 23 MMOL/L (ref 20–33)
CO2 SERPL-SCNC: 24 MMOL/L (ref 20–33)
CREAT SERPL-MCNC: 0.43 MG/DL (ref 0.5–1.4)
CREAT SERPL-MCNC: 0.49 MG/DL (ref 0.5–1.4)
ERYTHROCYTE [DISTWIDTH] IN BLOOD BY AUTOMATED COUNT: 42.6 FL (ref 35.9–50)
GLUCOSE SERPL-MCNC: 86 MG/DL (ref 65–99)
GLUCOSE SERPL-MCNC: 94 MG/DL (ref 65–99)
HCT VFR BLD AUTO: 24.7 % (ref 37–47)
HGB BLD-MCNC: 8.3 G/DL (ref 12–16)
MCH RBC QN AUTO: 31.1 PG (ref 27–33)
MCHC RBC AUTO-ENTMCNC: 33.6 G/DL (ref 33.6–35)
MCV RBC AUTO: 92.5 FL (ref 81.4–97.8)
PLATELET # BLD AUTO: 284 K/UL (ref 164–446)
PMV BLD AUTO: 10 FL (ref 9–12.9)
POTASSIUM SERPL-SCNC: 2.8 MMOL/L (ref 3.6–5.5)
POTASSIUM SERPL-SCNC: 3 MMOL/L (ref 3.6–5.5)
RBC # BLD AUTO: 2.67 M/UL (ref 4.2–5.4)
SODIUM SERPL-SCNC: 137 MMOL/L (ref 135–145)
SODIUM SERPL-SCNC: 141 MMOL/L (ref 135–145)
WBC # BLD AUTO: 8.3 K/UL (ref 4.8–10.8)

## 2018-10-27 PROCEDURE — A9270 NON-COVERED ITEM OR SERVICE: HCPCS | Performed by: STUDENT IN AN ORGANIZED HEALTH CARE EDUCATION/TRAINING PROGRAM

## 2018-10-27 PROCEDURE — 700102 HCHG RX REV CODE 250 W/ 637 OVERRIDE(OP): Performed by: STUDENT IN AN ORGANIZED HEALTH CARE EDUCATION/TRAINING PROGRAM

## 2018-10-27 PROCEDURE — 700102 HCHG RX REV CODE 250 W/ 637 OVERRIDE(OP): Performed by: INTERNAL MEDICINE

## 2018-10-27 PROCEDURE — 36415 COLL VENOUS BLD VENIPUNCTURE: CPT

## 2018-10-27 PROCEDURE — 85027 COMPLETE CBC AUTOMATED: CPT

## 2018-10-27 PROCEDURE — 80048 BASIC METABOLIC PNL TOTAL CA: CPT

## 2018-10-27 PROCEDURE — G8990 OTHER PT/OT CURRENT STATUS: HCPCS | Mod: CH

## 2018-10-27 PROCEDURE — 302146: Performed by: INTERNAL MEDICINE

## 2018-10-27 PROCEDURE — 770006 HCHG ROOM/CARE - MED/SURG/GYN SEMI*

## 2018-10-27 PROCEDURE — 700111 HCHG RX REV CODE 636 W/ 250 OVERRIDE (IP): Performed by: STUDENT IN AN ORGANIZED HEALTH CARE EDUCATION/TRAINING PROGRAM

## 2018-10-27 PROCEDURE — A9270 NON-COVERED ITEM OR SERVICE: HCPCS | Performed by: INTERNAL MEDICINE

## 2018-10-27 PROCEDURE — G8991 OTHER PT/OT GOAL STATUS: HCPCS | Mod: CH

## 2018-10-27 PROCEDURE — 99232 SBSQ HOSP IP/OBS MODERATE 35: CPT | Mod: GC | Performed by: INTERNAL MEDICINE

## 2018-10-27 PROCEDURE — 97161 PT EVAL LOW COMPLEX 20 MIN: CPT

## 2018-10-27 PROCEDURE — 700111 HCHG RX REV CODE 636 W/ 250 OVERRIDE (IP): Performed by: INTERNAL MEDICINE

## 2018-10-27 PROCEDURE — 72100 X-RAY EXAM L-S SPINE 2/3 VWS: CPT

## 2018-10-27 RX ORDER — POTASSIUM CHLORIDE 20 MEQ/1
40 TABLET, EXTENDED RELEASE ORAL 2 TIMES DAILY
Status: DISCONTINUED | OUTPATIENT
Start: 2018-10-27 | End: 2018-10-27

## 2018-10-27 RX ORDER — HYDROCODONE BITARTRATE AND ACETAMINOPHEN 10; 325 MG/1; MG/1
1 TABLET ORAL EVERY 4 HOURS PRN
Status: DISCONTINUED | OUTPATIENT
Start: 2018-10-27 | End: 2018-10-29

## 2018-10-27 RX ORDER — LISINOPRIL 10 MG/1
10 TABLET ORAL ONCE
Status: COMPLETED | OUTPATIENT
Start: 2018-10-27 | End: 2018-10-27

## 2018-10-27 RX ORDER — POTASSIUM CHLORIDE 20 MEQ/1
40 TABLET, EXTENDED RELEASE ORAL DAILY
Status: DISCONTINUED | OUTPATIENT
Start: 2018-10-27 | End: 2018-10-28

## 2018-10-27 RX ORDER — MORPHINE SULFATE 10 MG/ML
5 INJECTION, SOLUTION INTRAMUSCULAR; INTRAVENOUS
Status: DISCONTINUED | OUTPATIENT
Start: 2018-10-27 | End: 2018-10-27

## 2018-10-27 RX ORDER — LISINOPRIL 10 MG/1
30 TABLET ORAL DAILY
Status: DISCONTINUED | OUTPATIENT
Start: 2018-10-28 | End: 2018-10-29

## 2018-10-27 RX ORDER — POTASSIUM CHLORIDE 20 MEQ/1
40 TABLET, EXTENDED RELEASE ORAL ONCE
Status: COMPLETED | OUTPATIENT
Start: 2018-10-27 | End: 2018-10-27

## 2018-10-27 RX ORDER — MORPHINE SULFATE 10 MG/ML
5 INJECTION, SOLUTION INTRAMUSCULAR; INTRAVENOUS EVERY 4 HOURS PRN
Status: DISCONTINUED | OUTPATIENT
Start: 2018-10-27 | End: 2018-10-29

## 2018-10-27 RX ADMIN — HYDROCODONE BITARTRATE AND ACETAMINOPHEN 1 TABLET: 10; 325 TABLET ORAL at 07:45

## 2018-10-27 RX ADMIN — ATORVASTATIN CALCIUM 40 MG: 40 TABLET, FILM COATED ORAL at 16:44

## 2018-10-27 RX ADMIN — HYDROCODONE BITARTRATE AND ACETAMINOPHEN 1 TABLET: 10; 325 TABLET ORAL at 19:49

## 2018-10-27 RX ADMIN — MORPHINE SULFATE 5 MG: 10 INJECTION INTRAVENOUS at 05:12

## 2018-10-27 RX ADMIN — ENOXAPARIN SODIUM 40 MG: 100 INJECTION SUBCUTANEOUS at 05:12

## 2018-10-27 RX ADMIN — POTASSIUM CHLORIDE 40 MEQ: 1500 TABLET, EXTENDED RELEASE ORAL at 17:37

## 2018-10-27 RX ADMIN — POTASSIUM CHLORIDE 40 MEQ: 1500 TABLET, EXTENDED RELEASE ORAL at 14:53

## 2018-10-27 RX ADMIN — SENNOSIDES AND DOCUSATE SODIUM 2 TABLET: 8.6; 5 TABLET ORAL at 16:44

## 2018-10-27 RX ADMIN — MORPHINE SULFATE 5 MG: 10 INJECTION INTRAVENOUS at 11:05

## 2018-10-27 RX ADMIN — HYDROCODONE BITARTRATE AND ACETAMINOPHEN 1 TABLET: 10; 325 TABLET ORAL at 14:52

## 2018-10-27 RX ADMIN — MORPHINE SULFATE 5 MG: 10 INJECTION INTRAVENOUS at 16:44

## 2018-10-27 RX ADMIN — BUPROPION HYDROCHLORIDE 100 MG: 100 TABLET, FILM COATED, EXTENDED RELEASE ORAL at 05:13

## 2018-10-27 RX ADMIN — LISINOPRIL 10 MG: 10 TABLET ORAL at 07:45

## 2018-10-27 RX ADMIN — DONEPEZIL HYDROCHLORIDE 5 MG: 5 TABLET, FILM COATED ORAL at 16:44

## 2018-10-27 RX ADMIN — CITALOPRAM HYDROBROMIDE 20 MG: 20 TABLET ORAL at 05:13

## 2018-10-27 RX ADMIN — HYDRALAZINE HYDROCHLORIDE 10 MG: 20 INJECTION INTRAMUSCULAR; INTRAVENOUS at 06:48

## 2018-10-27 RX ADMIN — HYDROCODONE BITARTRATE AND ACETAMINOPHEN 1 TABLET: 10; 325 TABLET ORAL at 00:38

## 2018-10-27 RX ADMIN — LISINOPRIL 20 MG: 20 TABLET ORAL at 05:13

## 2018-10-27 RX ADMIN — SENNOSIDES AND DOCUSATE SODIUM 2 TABLET: 8.6; 5 TABLET ORAL at 05:13

## 2018-10-27 RX ADMIN — METOPROLOL SUCCINATE 25 MG: 25 TABLET, EXTENDED RELEASE ORAL at 05:13

## 2018-10-27 RX ADMIN — OMEPRAZOLE 40 MG: 20 CAPSULE, DELAYED RELEASE ORAL at 05:12

## 2018-10-27 ASSESSMENT — PAIN SCALES - GENERAL
PAINLEVEL_OUTOF10: 10
PAINLEVEL_OUTOF10: 8
PAINLEVEL_OUTOF10: 8

## 2018-10-27 NOTE — PROGRESS NOTES
Bedside report received by day LESLIE Mtz. Patient lying in bed watching TV at this time. POC discussed, verbalized understanding. No immediate concerns for patient at this time. All safety measures in place.

## 2018-10-27 NOTE — PROGRESS NOTES
Patient in 8/10 lower back and headache pain. Norco and lisinopril administered as ordered. Patient on bedpan at this time.

## 2018-10-27 NOTE — PROGRESS NOTES
Dr. Horn notified of increase platelet count from 165 to 284. Also notified pt c/o nausea and no prn order for antiemetic. Also notified patient has urinary urgency requiring bedpan due to limited ROM and lower back pain and also has a skin tear on buttocks. Provider to call nurse back.

## 2018-10-27 NOTE — PROGRESS NOTES
· 2 RN skin check complete.   · Devices in place n/a  · Skin assessed under devices n/a  · Confirmed pressure ulcers found on left buttocks.  · New potential pressure ulcers noted on n/a. Wound consult and MIDAS placed.  · The following interventions in place q2hour turns, 3 surgical site dressings left lateral thigh clean/dry/intact, wound consult already in place for left buttocks, heels offloaded on pillow.

## 2018-10-27 NOTE — PROGRESS NOTES
"   Orthopaedic Progress Note    Interval changes:  Patient doing well  Cleared for DC to SNF by ortho pending medicine clearance    ROS - Patient denies any new issues.  Pain well controlled.    Blood pressure (!) 197/97, pulse 79, temperature 37.3 °C (99.1 °F), resp. rate 16, height 1.575 m (5' 2\"), weight 55.6 kg (122 lb 9.2 oz), SpO2 89 %, not currently breastfeeding.    Patient seen and examined  No acute distress  Breathing non labored  RRR  Left hip surgical dressings are clean, dry, and intact. Patient clearly fires tibialis anterior, EHL, and gastrocnemius/soleus. Sensation is intact to light touch throughout superficial peroneal, deep peroneal, tibial, saphenous, and sural nerve distributions. Strong and palpable 2+ dorsalis pedis and posterior tibial pulses with capillary refill less than 2 seconds. No lower leg tenderness or discomfort.     Recent Labs      10/24/18   0238  10/25/18   0046   WBC  6.1  7.0   RBC  2.56*  2.42*   HEMOGLOBIN  8.2*  7.8*   HEMATOCRIT  25.2*  23.5*   MCV  98.4*  97.1   MCH  32.0  32.2   MCHC  32.5*  33.2*   RDW  46.6  46.6   PLATELETCT  159*  165   MPV  10.1  10.1       Active Hospital Problems    Diagnosis   • Intertrochanteric fracture of left femur, closed, initial encounter ([facility]) [S72.142A]     Priority: High     [facility] (10/22/18), patient states she tripped over her dog in the kitchen and fell  Her son's girlfriend found her and called EMS, patient arrived to [facility] ED via flight care  During flight, Ems witnessed a vasovagal syncopal episode.   XR showed likely communited (L) intertrochanteric fracture     • Dementia [F03.90]     Priority: Medium   • Essential hypertension, benign [I10]     Priority: Low   • Normocytic normochromic anemia [D64.9]     Priority: Low   • Dyslipidemia [E78.5]     Priority: Low   • Aortic systolic murmur on examination [I35.8]     Priority: Low     Assessment/Plan:  Patient doing well  POD#3 S/P Intramedullary nailing of left hip  Wt bearing " status - WBAT  Wound care/Drains - dressing CDI  Future Procedures - none planend   Lovenox: Start 10/24, Duration-until ambulatory > 150'  Sutures/Staples out- 10-14 days post operatively  PT/OT-initiated  Antibiotics: completed  DVT Prophylaxis- TEDS/SCDs/Foot pumps  Mcmanus-none  Case Coordination for Discharge Planning - Disposition SNF

## 2018-10-27 NOTE — PROGRESS NOTES
· 2 RN skin check complete.   · Devices in place none.  · Skin assessed under devices n/a.  · Confirmed pressure ulcers found on n/a.  · New potential pressure ulcers noted on left buttock, open area noted. Wound consult and MIDAS placed.  · The following interventions in place: barrier cream, Q2H turns, heels offloaded.  · B/l heel redness noted, blanchable. Scab to L knee. 3 incisions to L hip, dressings CDI.

## 2018-10-27 NOTE — PROGRESS NOTES
Internal Medicine Interval Note  Note Author: Lisa Horn M.D.     Name Madhavi Montiel 1935   Age/Sex 83 y.o. female   MRN [id number]   Code Status FULL     After 5PM or if no immediate response to page, please call for cross-coverage  Attending/Team: Dr. Brenda Morales/Vladimir See Patient List for primary contact information  Call [phone] to page    1st Call - Day Intern (R1):   Dr. Lisa Horn  2nd Call - Day Sr. Resident (R2/R3):   Dr. Ahsan Stallings      Reason for interval visit  (Principal Problem)   Intertrochanteric fracture of left femur, closed, initial encounter (HCC)    Interval Problem Daily Status Update  (24 hours, problem oriented, brief subjective history, new lab/imaging data pertinent to that problem)   Overnight, patient's BP was elevated at 175/90. Prn hydralazine was given, however patient's BP still remained elevated. She is on lisinopril 20mg daily for BP, and this was increased to 30mg daily. Patient was also complaining of low back pain. No falls or other trauma to the area since admission. She was sent for XR of the lumbar spine which showed no acute fracture, and similar findings from her CT of the spine a few days ago.   Review of Systems   Unable to perform ROS: Dementia     Disposition/Barriers to discharge:   Awaiting placement     Consultants/Specialty  Dr. River Castillo, Orthopedic Surgery   PCP: No primary care provider on file.    Quality Measures  Quality-Core Measures   Reviewed items::  Labs reviewed, Medications reviewed and Radiology images reviewed  Mcmanus catheter::  No Mcmanus  DVT prophylaxis pharmacological::  Enoxaparin (Lovenox)  DVT prophylaxis - mechanical:  SCDs  Ulcer Prophylaxis::  Not indicated    Physical Exam     Vitals:    10/27/18 0615 10/27/18 0743 10/27/18 0749 10/27/18 1037   BP: (!) 181/88 (!) 173/83 (!) 166/100 (!) (P) 169/73   Pulse:   88 (P) 80   Resp:   16    Temp:   37 °C (98.6 °F)    SpO2:   95%    Weight:       Height:          Body mass index is 21.77 kg/m². Weight: 54 kg (119 lb 0.8 oz)  Oxygen Therapy:  Pulse Oximetry: 95 %, O2 (LPM): 2, O2 Delivery: Silicone Nasal Cannula    Physical Exam   Constitutional:   Thin, frail elderly female    HENT:   Head: Normocephalic and atraumatic.   Mouth/Throat: No oropharyngeal exudate.   Eyes: EOM are normal.   Neck: Normal range of motion.   Cardiovascular: Normal rate, regular rhythm, normal heart sounds and intact distal pulses.    Pulmonary/Chest: Effort normal and breath sounds normal. No respiratory distress. She has no wheezes.   Abdominal: Soft. Bowel sounds are normal. She exhibits no distension. There is no tenderness.   Musculoskeletal: Normal range of motion. She exhibits no edema.   Clean dressings at the (L) hip    Lymphadenopathy:     She has no cervical adenopathy.   Neurological: She is alert.   Oriented to self    Skin: Skin is warm and dry.   Psychiatric: Affect normal.   Nursing note and vitals reviewed.    Assessment/Plan     * Intertrochanteric fracture of left femur, closed, initial encounter ([facility])- (present on admission)   Overview    [facility]F (10/22/18), patient states she tripped over her dog in the kitchen and fell  Her son's girlfriend found her and called EMS, patient arrived to [facility] via flight care  During flight, Ems witnessed a vasovagal syncopal episode.   XR showed likely communited (L) intertrochanteric fracture     Assessment & Plan    Surgery was done with intramedullary nailing of left hip, with no complications.  She has  postoperative pain and back pain.  Responded to morphine and Norco.  No signs of bleeding or ecchymosis.    Plan  -Continue home pain management   -PT -recommended 3 times a week PT and for placement to skilled nursing nursing facility following discharge  -OT-recommended 4 times a week OT and for placement to skilled nursing facility following discharge  -Continue DVT prophylaxis        Dementia   Assessment & Plan    -Patient has history of  dementia, beginning ~2013 when her  passed away.   -Questionable history from patient, unsure of living situation   -Currently on aricept 5mg         Hypokalemia- (present on admission)   Assessment & Plan    K=2.6   -Repleted with 60meq orally  -Recheck BMP         Aortic systolic murmur on examination- (present on admission)   Assessment & Plan    Ejection systolic murmur at aortic area radiated to both carotids on examination, aortic stenosis murmur.  Records were obtained from her PCP who she last saw her in April 2018 with referral to a cardiologist, however no records of actually seeing the cardiologist.  Unable to obtain a reliable history from the patient.   No cardiovascular symptoms at current time.  Echo done with no pertinent findings EF: 65%  -Observe         Dyslipidemia- (present on admission)   Assessment & Plan    Chronic condition    Continue atorvastatin        Normocytic normochromic anemia- (present on admission)   Assessment & Plan    Hemoglobin stable.  Most likely due to blood loss during operation on IV fluid.  Asymptomatic.  No signs of active bleeding    Plan  -Continue to monitor hemodynamic status  -Transfuse for Hgb < 7.0        Essential hypertension, benign- (present on admission)   Assessment & Plan    Elevated to 170/90   -Continue metoprolol  -Increase lisinopril 20mg to 30mg   -prn hydralazine for BP >180/110   -Monitor

## 2018-10-27 NOTE — PROGRESS NOTES
Paged [facility] resident regarding high BP, nausea, and pain, MD made aware, has seen Pt. After administering morning blood pressure meds, blood pressure remains elevated. Paged on-call resident again, awaiting call back. Pt now on O2 nasal cannula, tolerating well. Will continue to monitor.

## 2018-10-27 NOTE — CARE PLAN
Problem: Communication  Goal: The ability to communicate needs accurately and effectively will improve  Outcome: PROGRESSING AS EXPECTED  Patient verbalizes needs appropriately.

## 2018-10-27 NOTE — PROGRESS NOTES
"   Orthopaedic Progress Note    Interval changes:  Patient doing well  Cleared for DC to SNF by ortho pending medicine clearance    ROS - Patient denies any new issues.  Pain well controlled.    Blood pressure (!) 167/65, pulse 77, temperature 36.6 °C (97.9 °F), resp. rate 16, height 1.575 m (5' 2\"), weight 54 kg (119 lb 0.8 oz), SpO2 97 %, not currently breastfeeding.    Patient seen and examined  No acute distress  Breathing non labored  RRR  Left hip surgical dressings are clean, dry, and intact. Patient clearly fires tibialis anterior, EHL, and gastrocnemius/soleus. Sensation is intact to light touch throughout superficial peroneal, deep peroneal, tibial, saphenous, and sural nerve distributions. Strong and palpable 2+ dorsalis pedis and posterior tibial pulses with capillary refill less than 2 seconds. No lower leg tenderness or discomfort.     Recent Labs      10/25/18   0046  10/27/18   0826   WBC  7.0  8.3   RBC  2.42*  2.67*   HEMOGLOBIN  7.8*  8.3*   HEMATOCRIT  23.5*  24.7*   MCV  97.1  92.5   MCH  32.2  31.1   MCHC  33.2*  33.6   RDW  46.6  42.6   PLATELETCT  165  284   MPV  10.1  10.0       Active Hospital Problems    Diagnosis   • Intertrochanteric fracture of left femur, closed, initial encounter ([facility]) [S72.142A]     Priority: High     GLF (10/22/18), patient states she tripped over her dog in the kitchen and fell  Her son's girlfriend found her and called EMS, patient arrived to [facility] ED via flight care  During flight, Ems witnessed a vasovagal syncopal episode.   XR showed likely communited (L) intertrochanteric fracture     • Dementia [F03.90]     Priority: Medium   • Hypokalemia [E87.6]     Priority: Medium   • Essential hypertension, benign [I10]     Priority: Low   • Normocytic normochromic anemia [D64.9]     Priority: Low   • Dyslipidemia [E78.5]     Priority: Low   • Aortic systolic murmur on examination [I35.8]     Priority: Low     Assessment/Plan:  Patient doing well  POD#4 S/P " Intramedullary nailing of left hip  Wt bearing status - WBAT  Wound care/Drains - dressing CDI  Future Procedures - none planend   Lovenox: Start 10/24, Duration-until ambulatory > 150'  Sutures/Staples out- 10-14 days post operatively  PT/OT-initiated  Antibiotics: completed  DVT Prophylaxis- TEDS/SCDs/Foot pumps  Mcmanus-none  Case Coordination for Discharge Planning - Disposition SNF

## 2018-10-27 NOTE — PROGRESS NOTES
Received Pt at approx 2150 in stable condition, no s/s of distress noted. Fall precautions in place, call bell within reach, will continue to monitor.

## 2018-10-27 NOTE — DISCHARGE PLANNING
Received notice from [facility] they have accepted patient.

## 2018-10-28 ENCOUNTER — APPOINTMENT (OUTPATIENT)
Dept: RADIOLOGY | Facility: MEDICAL CENTER | Age: 83
DRG: 481 | End: 2018-10-28
Attending: INTERNAL MEDICINE
Payer: MEDICARE

## 2018-10-28 PROBLEM — R62.7 ADULT FAILURE TO THRIVE: Status: ACTIVE | Noted: 2018-10-28

## 2018-10-28 PROBLEM — E87.29 STARVATION KETOACIDOSIS: Status: ACTIVE | Noted: 2018-10-28

## 2018-10-28 PROBLEM — R32 URINARY INCONTINENCE: Status: ACTIVE | Noted: 2018-10-28

## 2018-10-28 PROBLEM — T73.0XXA STARVATION KETOACIDOSIS: Status: ACTIVE | Noted: 2018-10-28

## 2018-10-28 LAB
ALBUMIN SERPL BCP-MCNC: 3.2 G/DL (ref 3.2–4.9)
ALBUMIN SERPL BCP-MCNC: 3.3 G/DL (ref 3.2–4.9)
ALBUMIN/GLOB SERPL: 1.3 G/DL
ALBUMIN/GLOB SERPL: 1.4 G/DL
ALP SERPL-CCNC: 61 U/L (ref 30–99)
ALP SERPL-CCNC: 61 U/L (ref 30–99)
ALT SERPL-CCNC: 12 U/L (ref 2–50)
ALT SERPL-CCNC: 12 U/L (ref 2–50)
ANION GAP SERPL CALC-SCNC: 12 MMOL/L (ref 0–11.9)
ANION GAP SERPL CALC-SCNC: 18 MMOL/L (ref 0–11.9)
APPEARANCE UR: CLEAR
AST SERPL-CCNC: 17 U/L (ref 12–45)
AST SERPL-CCNC: 19 U/L (ref 12–45)
BILIRUB SERPL-MCNC: 1.5 MG/DL (ref 0.1–1.5)
BILIRUB SERPL-MCNC: 1.7 MG/DL (ref 0.1–1.5)
BILIRUB UR QL STRIP.AUTO: NEGATIVE
BUN SERPL-MCNC: 9 MG/DL (ref 8–22)
BUN SERPL-MCNC: 9 MG/DL (ref 8–22)
CALCIUM SERPL-MCNC: 8.7 MG/DL (ref 8.5–10.5)
CALCIUM SERPL-MCNC: 8.7 MG/DL (ref 8.5–10.5)
CHLORIDE SERPL-SCNC: 97 MMOL/L (ref 96–112)
CHLORIDE SERPL-SCNC: 99 MMOL/L (ref 96–112)
CO2 SERPL-SCNC: 19 MMOL/L (ref 20–33)
CO2 SERPL-SCNC: 23 MMOL/L (ref 20–33)
COLOR UR: YELLOW
CREAT SERPL-MCNC: 0.49 MG/DL (ref 0.5–1.4)
CREAT SERPL-MCNC: 0.52 MG/DL (ref 0.5–1.4)
ERYTHROCYTE [DISTWIDTH] IN BLOOD BY AUTOMATED COUNT: 46.1 FL (ref 35.9–50)
GLOBULIN SER CALC-MCNC: 2.3 G/DL (ref 1.9–3.5)
GLOBULIN SER CALC-MCNC: 2.4 G/DL (ref 1.9–3.5)
GLUCOSE SERPL-MCNC: 106 MG/DL (ref 65–99)
GLUCOSE SERPL-MCNC: 75 MG/DL (ref 65–99)
GLUCOSE UR STRIP.AUTO-MCNC: NEGATIVE MG/DL
HCT VFR BLD AUTO: 26.7 % (ref 37–47)
HGB BLD-MCNC: 8.4 G/DL (ref 12–16)
KETONES UR STRIP.AUTO-MCNC: >=160 MG/DL
LACTATE BLD-SCNC: 1 MMOL/L (ref 0.5–2)
LEUKOCYTE ESTERASE UR QL STRIP.AUTO: NEGATIVE
MAGNESIUM SERPL-MCNC: 1.5 MG/DL (ref 1.5–2.5)
MCH RBC QN AUTO: 31 PG (ref 27–33)
MCHC RBC AUTO-ENTMCNC: 31.5 G/DL (ref 33.6–35)
MCV RBC AUTO: 98.5 FL (ref 81.4–97.8)
MICRO URNS: NORMAL
NITRITE UR QL STRIP.AUTO: NEGATIVE
PH UR STRIP.AUTO: 7 [PH]
PLATELET # BLD AUTO: 263 K/UL (ref 164–446)
PMV BLD AUTO: 10.2 FL (ref 9–12.9)
POTASSIUM SERPL-SCNC: 3 MMOL/L (ref 3.6–5.5)
POTASSIUM SERPL-SCNC: 3.1 MMOL/L (ref 3.6–5.5)
PROT SERPL-MCNC: 5.6 G/DL (ref 6–8.2)
PROT SERPL-MCNC: 5.6 G/DL (ref 6–8.2)
PROT UR QL STRIP: NEGATIVE MG/DL
RBC # BLD AUTO: 2.71 M/UL (ref 4.2–5.4)
RBC UR QL AUTO: NEGATIVE
SODIUM SERPL-SCNC: 132 MMOL/L (ref 135–145)
SODIUM SERPL-SCNC: 136 MMOL/L (ref 135–145)
SP GR UR STRIP.AUTO: 1.01
UROBILINOGEN UR STRIP.AUTO-MCNC: 1 MG/DL
WBC # BLD AUTO: 7.1 K/UL (ref 4.8–10.8)

## 2018-10-28 PROCEDURE — 700101 HCHG RX REV CODE 250: Performed by: INTERNAL MEDICINE

## 2018-10-28 PROCEDURE — 99233 SBSQ HOSP IP/OBS HIGH 50: CPT | Mod: GC | Performed by: INTERNAL MEDICINE

## 2018-10-28 PROCEDURE — A9270 NON-COVERED ITEM OR SERVICE: HCPCS | Performed by: INTERNAL MEDICINE

## 2018-10-28 PROCEDURE — 700102 HCHG RX REV CODE 250 W/ 637 OVERRIDE(OP): Performed by: STUDENT IN AN ORGANIZED HEALTH CARE EDUCATION/TRAINING PROGRAM

## 2018-10-28 PROCEDURE — G8996 SWALLOW CURRENT STATUS: HCPCS | Mod: CK

## 2018-10-28 PROCEDURE — A9270 NON-COVERED ITEM OR SERVICE: HCPCS | Performed by: STUDENT IN AN ORGANIZED HEALTH CARE EDUCATION/TRAINING PROGRAM

## 2018-10-28 PROCEDURE — 770006 HCHG ROOM/CARE - MED/SURG/GYN SEMI*

## 2018-10-28 PROCEDURE — 700111 HCHG RX REV CODE 636 W/ 250 OVERRIDE (IP): Performed by: PHYSICAL MEDICINE & REHABILITATION

## 2018-10-28 PROCEDURE — 74018 RADEX ABDOMEN 1 VIEW: CPT

## 2018-10-28 PROCEDURE — 700102 HCHG RX REV CODE 250 W/ 637 OVERRIDE(OP): Performed by: INTERNAL MEDICINE

## 2018-10-28 PROCEDURE — 700111 HCHG RX REV CODE 636 W/ 250 OVERRIDE (IP): Performed by: INTERNAL MEDICINE

## 2018-10-28 PROCEDURE — 92610 EVALUATE SWALLOWING FUNCTION: CPT

## 2018-10-28 PROCEDURE — 80053 COMPREHEN METABOLIC PANEL: CPT

## 2018-10-28 PROCEDURE — 36415 COLL VENOUS BLD VENIPUNCTURE: CPT

## 2018-10-28 PROCEDURE — 700111 HCHG RX REV CODE 636 W/ 250 OVERRIDE (IP): Performed by: STUDENT IN AN ORGANIZED HEALTH CARE EDUCATION/TRAINING PROGRAM

## 2018-10-28 PROCEDURE — G8997 SWALLOW GOAL STATUS: HCPCS | Mod: CJ

## 2018-10-28 PROCEDURE — 83735 ASSAY OF MAGNESIUM: CPT

## 2018-10-28 PROCEDURE — 87040 BLOOD CULTURE FOR BACTERIA: CPT

## 2018-10-28 PROCEDURE — 85027 COMPLETE CBC AUTOMATED: CPT

## 2018-10-28 PROCEDURE — 81003 URINALYSIS AUTO W/O SCOPE: CPT

## 2018-10-28 PROCEDURE — 83605 ASSAY OF LACTIC ACID: CPT

## 2018-10-28 RX ORDER — DEXTROSE MONOHYDRATE, SODIUM CHLORIDE, AND POTASSIUM CHLORIDE 50; 2.98; 9 G/1000ML; G/1000ML; G/1000ML
1000 INJECTION, SOLUTION INTRAVENOUS CONTINUOUS
Status: DISCONTINUED | OUTPATIENT
Start: 2018-10-28 | End: 2018-10-28

## 2018-10-28 RX ORDER — BISACODYL 10 MG
10 SUPPOSITORY, RECTAL RECTAL
Status: DISCONTINUED | OUTPATIENT
Start: 2018-10-28 | End: 2018-10-29

## 2018-10-28 RX ORDER — DEXTROSE MONOHYDRATE, SODIUM CHLORIDE, AND POTASSIUM CHLORIDE 50; 2.98; 9 G/1000ML; G/1000ML; G/1000ML
1000 INJECTION, SOLUTION INTRAVENOUS CONTINUOUS
Status: DISCONTINUED | OUTPATIENT
Start: 2018-10-28 | End: 2018-10-30

## 2018-10-28 RX ORDER — POTASSIUM CHLORIDE 7.45 MG/ML
10 INJECTION INTRAVENOUS
Status: COMPLETED | OUTPATIENT
Start: 2018-10-28 | End: 2018-10-29

## 2018-10-28 RX ORDER — DEXTROSE MONOHYDRATE 50 MG/ML
INJECTION, SOLUTION INTRAVENOUS CONTINUOUS
Status: DISCONTINUED | OUTPATIENT
Start: 2018-10-28 | End: 2018-10-28

## 2018-10-28 RX ORDER — AMOXICILLIN 250 MG
2 CAPSULE ORAL 2 TIMES DAILY PRN
Status: DISCONTINUED | OUTPATIENT
Start: 2018-10-28 | End: 2018-10-29

## 2018-10-28 RX ORDER — MAGNESIUM SULFATE HEPTAHYDRATE 40 MG/ML
2 INJECTION, SOLUTION INTRAVENOUS ONCE
Status: COMPLETED | OUTPATIENT
Start: 2018-10-28 | End: 2018-10-28

## 2018-10-28 RX ORDER — POLYETHYLENE GLYCOL 3350 17 G/17G
1 POWDER, FOR SOLUTION ORAL
Status: DISCONTINUED | OUTPATIENT
Start: 2018-10-28 | End: 2018-10-29

## 2018-10-28 RX ORDER — POTASSIUM CHLORIDE 7.45 MG/ML
10 INJECTION INTRAVENOUS
Status: COMPLETED | OUTPATIENT
Start: 2018-10-28 | End: 2018-10-28

## 2018-10-28 RX ORDER — DEXTROSE AND SODIUM CHLORIDE 5; .9 G/100ML; G/100ML
INJECTION, SOLUTION INTRAVENOUS CONTINUOUS
Status: DISCONTINUED | OUTPATIENT
Start: 2018-10-28 | End: 2018-10-28

## 2018-10-28 RX ORDER — POTASSIUM CHLORIDE 20 MEQ/1
40 TABLET, EXTENDED RELEASE ORAL ONCE
Status: DISCONTINUED | OUTPATIENT
Start: 2018-10-28 | End: 2018-10-28

## 2018-10-28 RX ADMIN — DONEPEZIL HYDROCHLORIDE 5 MG: 5 TABLET, FILM COATED ORAL at 17:56

## 2018-10-28 RX ADMIN — POTASSIUM CHLORIDE 10 MEQ: 7.46 INJECTION, SOLUTION INTRAVENOUS at 14:10

## 2018-10-28 RX ADMIN — POTASSIUM CHLORIDE 10 MEQ: 7.46 INJECTION, SOLUTION INTRAVENOUS at 22:53

## 2018-10-28 RX ADMIN — MORPHINE SULFATE 5 MG: 10 INJECTION INTRAVENOUS at 12:11

## 2018-10-28 RX ADMIN — HYDROCODONE BITARTRATE AND ACETAMINOPHEN 1 TABLET: 10; 325 TABLET ORAL at 05:37

## 2018-10-28 RX ADMIN — LISINOPRIL 30 MG: 10 TABLET ORAL at 05:39

## 2018-10-28 RX ADMIN — ATORVASTATIN CALCIUM 40 MG: 40 TABLET, FILM COATED ORAL at 17:55

## 2018-10-28 RX ADMIN — METOPROLOL SUCCINATE 25 MG: 25 TABLET, EXTENDED RELEASE ORAL at 05:38

## 2018-10-28 RX ADMIN — ENOXAPARIN SODIUM 40 MG: 100 INJECTION SUBCUTANEOUS at 05:38

## 2018-10-28 RX ADMIN — POTASSIUM CHLORIDE 10 MEQ: 7.46 INJECTION, SOLUTION INTRAVENOUS at 12:12

## 2018-10-28 RX ADMIN — HYDROCODONE BITARTRATE AND ACETAMINOPHEN 1 TABLET: 10; 325 TABLET ORAL at 22:22

## 2018-10-28 RX ADMIN — HYDROCODONE BITARTRATE AND ACETAMINOPHEN 1 TABLET: 10; 325 TABLET ORAL at 01:16

## 2018-10-28 RX ADMIN — CITALOPRAM HYDROBROMIDE 20 MG: 20 TABLET ORAL at 05:38

## 2018-10-28 RX ADMIN — MORPHINE SULFATE 5 MG: 10 INJECTION INTRAVENOUS at 20:23

## 2018-10-28 RX ADMIN — POTASSIUM CHLORIDE, DEXTROSE MONOHYDRATE AND SODIUM CHLORIDE 1000 ML: 300; 5; 900 INJECTION, SOLUTION INTRAVENOUS at 17:56

## 2018-10-28 RX ADMIN — MAGNESIUM SULFATE IN WATER 2 G: 40 INJECTION, SOLUTION INTRAVENOUS at 09:25

## 2018-10-28 RX ADMIN — BUPROPION HYDROCHLORIDE 100 MG: 100 TABLET, FILM COATED, EXTENDED RELEASE ORAL at 05:40

## 2018-10-28 RX ADMIN — POLYETHYLENE GLYCOL 3350 1 PACKET: 17 POWDER, FOR SOLUTION ORAL at 05:39

## 2018-10-28 RX ADMIN — POTASSIUM CHLORIDE 10 MEQ: 7.46 INJECTION, SOLUTION INTRAVENOUS at 14:00

## 2018-10-28 RX ADMIN — POTASSIUM CHLORIDE 10 MEQ: 7.46 INJECTION, SOLUTION INTRAVENOUS at 23:54

## 2018-10-28 RX ADMIN — POTASSIUM CHLORIDE 10 MEQ: 7.46 INJECTION, SOLUTION INTRAVENOUS at 15:40

## 2018-10-28 ASSESSMENT — PAIN SCALES - GENERAL
PAINLEVEL_OUTOF10: 8

## 2018-10-28 NOTE — PROGRESS NOTES
· 2 RN skin check complete with LESLIE Dye.   · Devices in place - .  · Skin assessed under .  · Confirmed DTI found on buttock.  · The following interventions in place - Low air loss mattress, Q2 turns.

## 2018-10-28 NOTE — THERAPY
"Speech Language Therapy Clinical Swallow Evaluation completed.  Functional Status: Patient crying out in pain but asking for water. She would jerk her body and gasp but reported this was all due to just back pain. This behavior was not observed when reminiscing about her life in [place], job, pets owned, etc. Her behavior immediately returned when we stopped reminiscing. Given support and encouragement she was able to follow some directions for an oral motor examination and consume PO. She had increased behavior of gasping and then gulping sounds when drinking water. Her voice remained clear throughout intake of ice, water, thick juice, and applesauce but her behaviors increased with water each time. Solids were not tested due to patient behavior. Currently, this is what appears to be placing patient at risk for aspiration versus having a neuromuscular dysfunction of the swallow. RN reports similar observations where the patient is calm but then starts gasping and jerking when she notices nursing staff in the room. Recommend to start nectar thick full liquid diet with 1:1 feeding. Crush meds. SLP will continue to follow. Consider Geriatric consult and/or psych consult.   Recommendations - Diet: Diet / Liquid Recommendation: Nectar Thick Full Liquid                          Strategies: Strict 1:1 feeding                           Medication Administration: Medication Administration : Crush all Medications in Puree  Plan of Care: Will benefit from Speech Therapy 3 times per week  Post-Acute Therapy: Recommend inpatient transitional care services for continued speech therapy services. Please consider physiatry (PM&R) consult.       See \"Rehab Therapy-Acute\" Patient Summary Report for complete documentation.   "

## 2018-10-28 NOTE — PROGRESS NOTES
Internal Medicine Interval Note  Note Author: Lisa Horn M.D.     Name Madhavi Montiel DOB 1935   Age/Sex 83 y.o. female   MRN [id number]   Code Status FULL     After 5PM or if no immediate response to page, please call for cross-coverage  Attending/Team: Dr. Brenda Morales/Vladimir See Patient List for primary contact information  Call [phone] to page    1st Call - Day Intern (R1):   Dr. Lisa Horn  2nd Call - Day Sr. Resident (R2/R3):   Dr. Ahsan Stallings      Reason for interval visit  (Principal Problem)   Intertrochanteric fracture of left femur, closed, initial encounter (HCC)    Interval Problem Daily Status Update  (24 hours, problem oriented, brief subjective history, new lab/imaging data pertinent to that problem)   No acute overnight events.  I spoke to [place], the RN who has been taking care of Madhavi who states she is concerned for her as she has not consistently and only had 2 bites of ham to eat last night.  She has also been urinating frequently 15-20x per day often in small amounts, with incontinence.  But he has also had some skin breakdown at the buttock area.  Madhavi is complaining of pain this morning at the epigastric area, however on physical exam, patient states it does not really hurt upon palpation.  As this is nonspecific, it may be related to the urinary issues, and/or constipation, or possibly due to her chronic pain syndrome.  Will do abdominal x-ray and urinalysis to evaluate further.  Patient also has hypokalemia likely due to her decreased oral intake, will continue to replete.     Review of Systems   Unable to perform ROS: Dementia     Disposition/Barriers to discharge:   Awaiting SNF placement    Consultants/Specialty  Dr. River Castillo /orthopedic surgery  PCP: No primary care provider on file.      Quality Measures  Quality-Core Measures   Reviewed items::  Labs reviewed, Medications reviewed and Radiology images reviewed  Mcmanus catheter::  Neurogenic  Bladder  DVT prophylaxis pharmacological::  Enoxaparin (Lovenox)  DVT prophylaxis - mechanical:  SCDs  Ulcer Prophylaxis::  Not indicated    Physical Exam     Vitals:    10/27/18 1559 10/27/18 1924 10/28/18 0353 10/28/18 0750   BP: (!) 167/65 152/74 160/76 (!) 165/69   Pulse: 77 70 63 63   Resp: 16 16 16 16   Temp: 36.6 °C (97.9 °F) 36.9 °C (98.4 °F) 36.3 °C (97.4 °F) 36.7 °C (98.1 °F)   SpO2: 97% 98% 98% 94%   Weight:  52.3 kg (115 lb 4.8 oz)     Height:         Body mass index is 21.09 kg/m². Weight: 52.3 kg (115 lb 4.8 oz)  Oxygen Therapy:  Pulse Oximetry: 94 %, O2 (LPM): 0, O2 Delivery: None (Room Air)    Physical Exam   Constitutional:   Thin frail elderly female, obviously in discomfort   HENT:   Head: Normocephalic and atraumatic.   Neck: Normal range of motion.   Cardiovascular: Normal rate, regular rhythm, normal heart sounds and intact distal pulses.    Pulmonary/Chest: Effort normal and breath sounds normal. She has no wheezes. She has no rales.   Abdominal: Soft. Bowel sounds are normal. She exhibits no distension and no mass. There is no tenderness. There is no rebound and no guarding.   Musculoskeletal: Normal range of motion. She exhibits no edema.   Lymphadenopathy:     She has no cervical adenopathy.   Neurological: She is alert.   Oriented only to self   Skin: Skin is warm and dry.     Assessment/Plan     * Intertrochanteric fracture of left femur, closed, initial encounter (HCC)- (present on admission)   Overview    GLF (10/22/18), patient states she tripped over her dog in the kitchen and fell  Her son's girlfriend found her and called EMS, patient arrived to [facility] ED via flight care  During flight, Ems witnessed a vasovagal syncopal episode.   XR showed likely communited (L) intertrochanteric fracture     Assessment & Plan    Surgery was done with intramedullary nailing of left hip, with no complications.  Plan  -Continue home pain management, prn meds   -PT -recommended 3 times a week PT and  for placement to SNF   -OT-recommended 4 times a week OT and for placement to SNF   -Continue DVT prophylaxis        Adult failure to thrive   Assessment & Plan    Patient is not recovering well following surgery  She has not been eating regularly resulting in electrolyte abnormalities  Mobility and getting out of bed has been very difficult   -For speech, swallow and cognitive evaluation  -One-on-one feeding  -Repletion of electrolytes  -We will again try to contact her son Mayito  -Referral to geriatrics        Dementia   Assessment & Plan    -Patient has history of dementia, beginning ~2013 when her  passed away.   -Questionable history from patient, unsure of living situation   -Currently on aricept 5mg         Hypokalemia- (present on admission)   Assessment & Plan    Likely due to poor oral intake  K= 3.0, improved after oral KCl  -Replace magnesium  -Replace potassium  -Monitor        Urinary incontinence   Assessment & Plan    Patient has been voiding 15-20 times daily mostly in small amounts  Unclear if this is related to recent surgery, patient's dementia, history of incontinence, or new UTI  Patient has risk factors for UTI including recent Mcmanus cath  -Will rule out UTI with urinalysis  -Insert Mcmanus cath (patient has skin breakdown at the buttock area with urinary incontinence and difficulty moving due to chronic pain)  -Continue to monitor        Aortic systolic murmur on examination- (present on admission)   Assessment & Plan    Ejection systolic murmur at aortic area radiated to both carotids on examination, aortic stenosis murmur.  Records were obtained from her PCP who she last saw her in April 2018 with referral to a cardiologist, however no records of actually seeing the cardiologist.  Unable to obtain a reliable history from the patient.   No cardiovascular symptoms at current time.  Echo done with no pertinent findings EF: 65%  -Observe         Dyslipidemia- (present on admission)    Assessment & Plan    Chronic condition    Continue atorvastatin        Normocytic normochromic anemia- (present on admission)   Assessment & Plan    Hemoglobin stable.  Most likely due to blood loss during operation on IV fluid.  Asymptomatic.  No signs of active bleeding    Plan  -Continue to monitor hemodynamic status  -Transfuse for Hgb < 7.0        Essential hypertension, benign- (present on admission)   Assessment & Plan    -Continue metoprolol  -Continue lisinopril  -prn hydralazine for BP >180/110   -Monitor

## 2018-10-28 NOTE — CARE PLAN
Problem: Communication  Goal: The ability to communicate needs accurately and effectively will improve  Outcome: PROGRESSING AS EXPECTED      Problem: Safety  Goal: Will remain free from injury  Outcome: PROGRESSING AS EXPECTED  Fall precautions in place.

## 2018-10-28 NOTE — ASSESSMENT & PLAN NOTE
----- Message from Baldemar Carpenter sent at 9/25/2018  1:45 PM CDT -----  Contact: Self/[phone]  Refill:  amlodipine-benazepril 5-20 mg (LOTREL) 5-20 mg per capsule    Patient stated that he's completely out of this medication. Thank you.    .  [facility] [address] [place] [address] [place] [address] [facility] [place] [address] [address]  [place] [address]  Phone: [phone] Fax: [phone]     Patient has been voiding 15-20 times daily mostly in small amounts  -Cont Mcmanus cath (patient has skin breakdown at the buttock area with urinary incontinence and difficulty moving due to chronic pain)  -Continue to monitor

## 2018-10-28 NOTE — WOUND TEAM
Pt seen by wound team.  Purple discoloration on sacrum noted.  JANICE bed ordered.  Full note to follow

## 2018-10-28 NOTE — PROGRESS NOTES
· 2 RN skin check complete.   · Devices in place n/a.  · Skin assessed under devices n/a.  · Confirmed pressure ulcers found on left buttocks.  · New potential pressure ulcers noted on left upper back. Wound consult and MIDAS placed.  · The following interventions in place q2hour turns,low airloss mattress,heels offloaded on pillow,mepilex, wound team already notified..

## 2018-10-28 NOTE — PROGRESS NOTES
· 2 RN skin check complete.   · Devices in place n/a.  · Skin assessed under devices n/a.  · Confirmed pressure ulcers found on left buttocks.  · New potential pressure ulcers noted on left upper back. Wound consult and MIDAS placed.  · The following interventions in place q2hour turns, low airloss mattress, heels offloaded on pillow,meplizex,wound team already notified..

## 2018-10-28 NOTE — PROGRESS NOTES
"Dr. Horn notified night shift report coughing after each sip of water. New order for speech consult. Also notified patient had minimal intake of food and water yesterday with no appetite. Also notified of skin area on left buttocks and patient requiring bed pan multiple times an hour and being incontinent as well. Also notified before entering room patient is quiet and calm and when staff appears patient begins trembling and states \"my lower back hurts.\" Provider to place new orders.  "

## 2018-10-29 ENCOUNTER — APPOINTMENT (OUTPATIENT)
Dept: RADIOLOGY | Facility: MEDICAL CENTER | Age: 83
DRG: 481 | End: 2018-10-29
Attending: STUDENT IN AN ORGANIZED HEALTH CARE EDUCATION/TRAINING PROGRAM
Payer: MEDICARE

## 2018-10-29 ENCOUNTER — APPOINTMENT (OUTPATIENT)
Dept: RADIOLOGY | Facility: MEDICAL CENTER | Age: 83
DRG: 481 | End: 2018-10-29
Attending: INTERNAL MEDICINE
Payer: MEDICARE

## 2018-10-29 PROBLEM — R10.13 EPIGASTRIC PAIN: Status: ACTIVE | Noted: 2018-10-29

## 2018-10-29 PROBLEM — T73.0XXA STARVATION KETOACIDOSIS: Status: ACTIVE | Noted: 2018-10-29

## 2018-10-29 PROBLEM — E87.29 STARVATION KETOACIDOSIS: Status: ACTIVE | Noted: 2018-10-29

## 2018-10-29 LAB
ALBUMIN SERPL BCP-MCNC: 3.2 G/DL (ref 3.2–4.9)
ALBUMIN/GLOB SERPL: 1.4 G/DL
ALP SERPL-CCNC: 57 U/L (ref 30–99)
ALT SERPL-CCNC: 10 U/L (ref 2–50)
ANION GAP SERPL CALC-SCNC: 11 MMOL/L (ref 0–11.9)
ANION GAP SERPL CALC-SCNC: 8 MMOL/L (ref 0–11.9)
AST SERPL-CCNC: 16 U/L (ref 12–45)
BILIRUB SERPL-MCNC: 1.4 MG/DL (ref 0.1–1.5)
BUN SERPL-MCNC: 5 MG/DL (ref 8–22)
BUN SERPL-MCNC: 5 MG/DL (ref 8–22)
CALCIUM SERPL-MCNC: 8.6 MG/DL (ref 8.5–10.5)
CALCIUM SERPL-MCNC: 8.8 MG/DL (ref 8.5–10.5)
CHLORIDE SERPL-SCNC: 101 MMOL/L (ref 96–112)
CHLORIDE SERPL-SCNC: 103 MMOL/L (ref 96–112)
CO2 SERPL-SCNC: 26 MMOL/L (ref 20–33)
CO2 SERPL-SCNC: 27 MMOL/L (ref 20–33)
CREAT SERPL-MCNC: 0.42 MG/DL (ref 0.5–1.4)
CREAT SERPL-MCNC: 0.48 MG/DL (ref 0.5–1.4)
ERYTHROCYTE [DISTWIDTH] IN BLOOD BY AUTOMATED COUNT: 42.7 FL (ref 35.9–50)
GLOBULIN SER CALC-MCNC: 2.3 G/DL (ref 1.9–3.5)
GLUCOSE SERPL-MCNC: 129 MG/DL (ref 65–99)
GLUCOSE SERPL-MCNC: 142 MG/DL (ref 65–99)
HCT VFR BLD AUTO: 27 % (ref 37–47)
HGB BLD-MCNC: 9.3 G/DL (ref 12–16)
LIPASE SERPL-CCNC: 8 U/L (ref 11–82)
MAGNESIUM SERPL-MCNC: 1.5 MG/DL (ref 1.5–2.5)
MCH RBC QN AUTO: 31.7 PG (ref 27–33)
MCHC RBC AUTO-ENTMCNC: 34.4 G/DL (ref 33.6–35)
MCV RBC AUTO: 92.2 FL (ref 81.4–97.8)
PLATELET # BLD AUTO: 372 K/UL (ref 164–446)
PMV BLD AUTO: 9.5 FL (ref 9–12.9)
POTASSIUM SERPL-SCNC: 3.3 MMOL/L (ref 3.6–5.5)
POTASSIUM SERPL-SCNC: 3.6 MMOL/L (ref 3.6–5.5)
PROT SERPL-MCNC: 5.5 G/DL (ref 6–8.2)
RBC # BLD AUTO: 2.93 M/UL (ref 4.2–5.4)
SODIUM SERPL-SCNC: 138 MMOL/L (ref 135–145)
SODIUM SERPL-SCNC: 138 MMOL/L (ref 135–145)
WBC # BLD AUTO: 7 K/UL (ref 4.8–10.8)

## 2018-10-29 PROCEDURE — 700101 HCHG RX REV CODE 250: Performed by: INTERNAL MEDICINE

## 2018-10-29 PROCEDURE — 80048 BASIC METABOLIC PNL TOTAL CA: CPT

## 2018-10-29 PROCEDURE — 302043 TAPE, HYPAFIX: Performed by: HOSPITALIST

## 2018-10-29 PROCEDURE — 700102 HCHG RX REV CODE 250 W/ 637 OVERRIDE(OP): Performed by: INTERNAL MEDICINE

## 2018-10-29 PROCEDURE — 80053 COMPREHEN METABOLIC PANEL: CPT

## 2018-10-29 PROCEDURE — 700111 HCHG RX REV CODE 636 W/ 250 OVERRIDE (IP): Performed by: STUDENT IN AN ORGANIZED HEALTH CARE EDUCATION/TRAINING PROGRAM

## 2018-10-29 PROCEDURE — 770006 HCHG ROOM/CARE - MED/SURG/GYN SEMI*

## 2018-10-29 PROCEDURE — 76705 ECHO EXAM OF ABDOMEN: CPT

## 2018-10-29 PROCEDURE — 83690 ASSAY OF LIPASE: CPT

## 2018-10-29 PROCEDURE — 74018 RADEX ABDOMEN 1 VIEW: CPT

## 2018-10-29 PROCEDURE — G9169 MEMORY GOAL STATUS: HCPCS | Mod: CK

## 2018-10-29 PROCEDURE — 92523 SPEECH SOUND LANG COMPREHEN: CPT

## 2018-10-29 PROCEDURE — 700111 HCHG RX REV CODE 636 W/ 250 OVERRIDE (IP): Performed by: INTERNAL MEDICINE

## 2018-10-29 PROCEDURE — G9170 MEMORY D/C STATUS: HCPCS | Mod: CK

## 2018-10-29 PROCEDURE — A9270 NON-COVERED ITEM OR SERVICE: HCPCS | Performed by: STUDENT IN AN ORGANIZED HEALTH CARE EDUCATION/TRAINING PROGRAM

## 2018-10-29 PROCEDURE — 83735 ASSAY OF MAGNESIUM: CPT

## 2018-10-29 PROCEDURE — 36415 COLL VENOUS BLD VENIPUNCTURE: CPT

## 2018-10-29 PROCEDURE — 97535 SELF CARE MNGMENT TRAINING: CPT

## 2018-10-29 PROCEDURE — 700102 HCHG RX REV CODE 250 W/ 637 OVERRIDE(OP): Performed by: STUDENT IN AN ORGANIZED HEALTH CARE EDUCATION/TRAINING PROGRAM

## 2018-10-29 PROCEDURE — 97530 THERAPEUTIC ACTIVITIES: CPT

## 2018-10-29 PROCEDURE — 85027 COMPLETE CBC AUTOMATED: CPT

## 2018-10-29 PROCEDURE — 700111 HCHG RX REV CODE 636 W/ 250 OVERRIDE (IP): Performed by: PHYSICAL MEDICINE & REHABILITATION

## 2018-10-29 PROCEDURE — G9168 MEMORY CURRENT STATUS: HCPCS | Mod: CK

## 2018-10-29 PROCEDURE — 302255 BARRIER CREAM MOISTURE BAZA PROTECT (ZINC) 5OZ: Performed by: INTERNAL MEDICINE

## 2018-10-29 PROCEDURE — A9270 NON-COVERED ITEM OR SERVICE: HCPCS | Performed by: INTERNAL MEDICINE

## 2018-10-29 PROCEDURE — 99232 SBSQ HOSP IP/OBS MODERATE 35: CPT | Mod: GC | Performed by: HOSPITALIST

## 2018-10-29 RX ORDER — AMOXICILLIN 250 MG
2 CAPSULE ORAL 2 TIMES DAILY
Status: DISCONTINUED | OUTPATIENT
Start: 2018-10-29 | End: 2018-10-29

## 2018-10-29 RX ORDER — LISINOPRIL 20 MG/1
40 TABLET ORAL DAILY
Status: DISCONTINUED | OUTPATIENT
Start: 2018-10-30 | End: 2018-10-29

## 2018-10-29 RX ORDER — BISACODYL 10 MG
10 SUPPOSITORY, RECTAL RECTAL
Status: DISCONTINUED | OUTPATIENT
Start: 2018-10-29 | End: 2018-10-29

## 2018-10-29 RX ORDER — MORPHINE SULFATE 10 MG/ML
5 INJECTION, SOLUTION INTRAMUSCULAR; INTRAVENOUS EVERY 6 HOURS PRN
Status: DISCONTINUED | OUTPATIENT
Start: 2018-10-29 | End: 2018-10-30 | Stop reason: HOSPADM

## 2018-10-29 RX ORDER — HYDROCODONE BITARTRATE AND ACETAMINOPHEN 10; 325 MG/1; MG/1
1 TABLET ORAL EVERY 6 HOURS PRN
Status: DISCONTINUED | OUTPATIENT
Start: 2018-10-29 | End: 2018-10-30 | Stop reason: HOSPADM

## 2018-10-29 RX ORDER — BISACODYL 10 MG
10 SUPPOSITORY, RECTAL RECTAL
Status: DISCONTINUED | OUTPATIENT
Start: 2018-10-29 | End: 2018-10-30 | Stop reason: HOSPADM

## 2018-10-29 RX ORDER — POLYETHYLENE GLYCOL 3350 17 G/17G
1 POWDER, FOR SOLUTION ORAL
Status: DISCONTINUED | OUTPATIENT
Start: 2018-10-29 | End: 2018-10-29

## 2018-10-29 RX ORDER — POLYETHYLENE GLYCOL 3350 17 G/17G
1 POWDER, FOR SOLUTION ORAL
Status: DISCONTINUED | OUTPATIENT
Start: 2018-10-29 | End: 2018-10-30 | Stop reason: HOSPADM

## 2018-10-29 RX ORDER — LISINOPRIL 10 MG/1
10 TABLET ORAL ONCE
Status: DISCONTINUED | OUTPATIENT
Start: 2018-10-29 | End: 2018-10-29

## 2018-10-29 RX ORDER — POTASSIUM CHLORIDE 20 MEQ/1
40 TABLET, EXTENDED RELEASE ORAL DAILY
Status: DISCONTINUED | OUTPATIENT
Start: 2018-10-29 | End: 2018-10-29

## 2018-10-29 RX ORDER — AMOXICILLIN 250 MG
2 CAPSULE ORAL 2 TIMES DAILY PRN
Status: DISCONTINUED | OUTPATIENT
Start: 2018-10-29 | End: 2018-10-30 | Stop reason: HOSPADM

## 2018-10-29 RX ORDER — LISINOPRIL 20 MG/1
40 TABLET ORAL DAILY
Status: DISCONTINUED | OUTPATIENT
Start: 2018-10-29 | End: 2018-10-30 | Stop reason: HOSPADM

## 2018-10-29 RX ADMIN — MORPHINE SULFATE 5 MG: 10 INJECTION INTRAVENOUS at 03:20

## 2018-10-29 RX ADMIN — DONEPEZIL HYDROCHLORIDE 5 MG: 5 TABLET, FILM COATED ORAL at 18:37

## 2018-10-29 RX ADMIN — POTASSIUM CHLORIDE 40 MEQ: 1500 TABLET, EXTENDED RELEASE ORAL at 14:18

## 2018-10-29 RX ADMIN — BUPROPION HYDROCHLORIDE 100 MG: 100 TABLET, FILM COATED, EXTENDED RELEASE ORAL at 09:50

## 2018-10-29 RX ADMIN — LISINOPRIL 40 MG: 20 TABLET ORAL at 07:25

## 2018-10-29 RX ADMIN — HYDRALAZINE HYDROCHLORIDE 10 MG: 20 INJECTION INTRAMUSCULAR; INTRAVENOUS at 05:48

## 2018-10-29 RX ADMIN — POLYETHYLENE GLYCOL 3350 1 PACKET: 17 POWDER, FOR SOLUTION ORAL at 14:18

## 2018-10-29 RX ADMIN — LIDOCAINE HYDROCHLORIDE 15 ML: 20 SOLUTION OROPHARYNGEAL at 15:35

## 2018-10-29 RX ADMIN — ATORVASTATIN CALCIUM 40 MG: 40 TABLET, FILM COATED ORAL at 18:38

## 2018-10-29 RX ADMIN — POTASSIUM CHLORIDE, DEXTROSE MONOHYDRATE AND SODIUM CHLORIDE 1000 ML: 300; 5; 900 INJECTION, SOLUTION INTRAVENOUS at 21:04

## 2018-10-29 RX ADMIN — CITALOPRAM HYDROBROMIDE 20 MG: 20 TABLET ORAL at 09:51

## 2018-10-29 RX ADMIN — OMEPRAZOLE 40 MG: 20 CAPSULE, DELAYED RELEASE ORAL at 09:50

## 2018-10-29 RX ADMIN — HYDROCODONE BITARTRATE AND ACETAMINOPHEN 1 TABLET: 10; 325 TABLET ORAL at 14:18

## 2018-10-29 RX ADMIN — HYDROCODONE BITARTRATE AND ACETAMINOPHEN 1 TABLET: 10; 325 TABLET ORAL at 07:23

## 2018-10-29 RX ADMIN — METOPROLOL SUCCINATE 25 MG: 25 TABLET, EXTENDED RELEASE ORAL at 09:51

## 2018-10-29 RX ADMIN — ENOXAPARIN SODIUM 40 MG: 100 INJECTION SUBCUTANEOUS at 05:49

## 2018-10-29 RX ADMIN — POTASSIUM CHLORIDE 10 MEQ: 7.46 INJECTION, SOLUTION INTRAVENOUS at 02:22

## 2018-10-29 RX ADMIN — MORPHINE SULFATE 5 MG: 10 INJECTION INTRAVENOUS at 09:46

## 2018-10-29 RX ADMIN — HYDROCODONE BITARTRATE AND ACETAMINOPHEN 1 TABLET: 10; 325 TABLET ORAL at 21:05

## 2018-10-29 RX ADMIN — POTASSIUM CHLORIDE 10 MEQ: 7.46 INJECTION, SOLUTION INTRAVENOUS at 00:55

## 2018-10-29 ASSESSMENT — COGNITIVE AND FUNCTIONAL STATUS - GENERAL
PERSONAL GROOMING: A LITTLE
SUGGESTED CMS G CODE MODIFIER DAILY ACTIVITY: CL
DRESSING REGULAR UPPER BODY CLOTHING: A LITTLE
DAILY ACTIVITIY SCORE: 13
HELP NEEDED FOR BATHING: A LOT
TOILETING: A LITTLE
EATING MEALS: TOTAL
DRESSING REGULAR LOWER BODY CLOTHING: TOTAL

## 2018-10-29 ASSESSMENT — PAIN SCALES - GENERAL
PAINLEVEL_OUTOF10: 8
PAINLEVEL_OUTOF10: 5
PAINLEVEL_OUTOF10: 8
PAINLEVEL_OUTOF10: 10

## 2018-10-29 NOTE — DIETARY
Nutrition Update:  Patient not yet eating well.  Per nursing she complains of abdominal pain.  Patient having abdominal x-ray now.  Per speech therapy, patient has jerking motions and odd behaviors when she tries to do swallow evaluation.  Patient with dementia and complains of constipation, but is having bowel movements per nursing.  Patient is receiving supplements and snacks.  She is currently on a Nectar thick full liquid diet, but is not eating adequately.  Awaiting results of abdominal x-ray.  RD following

## 2018-10-29 NOTE — PROGRESS NOTES
Assumed patient care at 0700. Received report from night shift. Assessment completed. A&Ox1, self only. C/o 8/10 LLE pain, medicated per MAR. Mcmanus in place, draining to gravity. 2RN skin check completed. Q2h turns in place. Patient continuously removes dressings to LLE incisions, redressing as needed. NPO for abdominal US, per MD patient will resume prior diet (full liquid) after ultrasound. Fall precautions in place; bed alarm on, personal possessions and call light placed within reach. Communication board updated.

## 2018-10-29 NOTE — THERAPY
"Occupational Therapy Treatment completed with focus on ADLs and ADL transfers.  Functional Status:  Min A for supine>sit; Min A for sit>stand; min A for EOB>BSC transfer; min A for standing for toileting; min A for UB dressing; CGA for sit>supine  Plan of Care: Will benefit from Occupational Therapy 3 times per week  Discharge Recommendations:  Equipment Will Continue to Assess for Equipment Needs. Post-acute therapy Discharge to a transitional care facility for continued skilled therapy services.    See \"Rehab Therapy-Acute\" Patient Summary Report for complete documentation.   Pt participated in OT tx session. Pt continues to be limited due to decreased standing balance, functional mobility and activity tolerance. Pt agreeable to BSC for toileting, pt required increased time to sequence movements for functional transfers. Pt self-limiting due to anxiety about anticipation of pain during tx session. Will continue to follow for acute OT services while in-house.   "

## 2018-10-29 NOTE — DISCHARGE PLANNING
Anticipated Discharge Disposition: [facility] for skilled therapies.    Action: RN CM spoke with son, [phone], who states the best time to call him is between [phone]. He works in [employer] and commutes from [place]. LESLIE CORRIGAN gave Mayito phone number for Dr. Horn, [phone], and told him how to reach doctor through this number. LESLIE CORRIGAN encouraged Mayito to call now before he leaves for work. Dr. Horn paged to notify her of correct phone number as listed on facesheet and that I did speak with son. Dr. Horn is going to try to call him now.    Barriers to Discharge: Medical clearance.    Plan: RN CM to update [facility].

## 2018-10-29 NOTE — CARE PLAN
Problem: Discharge Barriers/Planning  Goal: Patient's continuum of care needs will be met    Intervention: Collaborate with Transitional Care Team and Interdisciplinary Team to meet discharge needs  Patient accepted by [facility] once medically clear.       Problem: Pain Management  Goal: Pain level will decrease to patient's comfort goal    Intervention: Follow pain managment plan developed in collaboration with patient and Interdisciplinary Team  LLE pain managed with PRN medication. Patient reports adequate relief.

## 2018-10-29 NOTE — ASSESSMENT & PLAN NOTE
Patient complaining of epigastric pain, new since admission; has been having regular bowel movements (2x today, 1 yesterday, brown in color, no blood)   Physical exam: not distended, normoactive bowel sounds, soft, (+) tenderness at epigastric area, no rebound/rigidity  Abdominal XR (10/28/18): AP view the abdomen demonstrates scattered loops of air-filled bowel. No significant bowel distention is identified. There are vascular calcifications. There is spinal curvature with degenerative change. A left femur repair is partially visualized.  RUQ US (10/29/18): Small right pleural effusion. Mild dilatation of the pancreatic duct, can be age-related or associated with biliary ductal obstruction from stone, stenosis, or ampullary lesion. Could be further evaluated with ERCP or MRCP as clinically appropriate.  Abdominal XR (10/29/18):   -Cont PPI   -anti-constipation meds prn

## 2018-10-29 NOTE — PROGRESS NOTES
"   Orthopaedic Progress Note    Interval changes:  Patient doing well  Dressings to be changed by nursing today- nursing communication for dressing change placed  Cleared for DC to SNF by ortho pending medicine clearance    ROS - Patient denies any new issues.  Pain well controlled.    Blood pressure 144/85, pulse 89, temperature 36.1 °C (97 °F), resp. rate 20, height 1.575 m (5' 2\"), weight 52.3 kg (115 lb 4.8 oz), SpO2 96 %, not currently breastfeeding.    Patient seen and examined  No acute distress  Breathing non labored  RRR  Left hip dressing CDI. Patient clearly fires tibialis anterior, EHL, and gastrocnemius/soleus. Sensation is intact to light touch throughout superficial peroneal, deep peroneal, tibial, saphenous, and sural nerve distributions. Strong and palpable 2+ dorsalis pedis and posterior tibial pulses with capillary refill less than 2 seconds. No lower leg tenderness or discomfort.     Recent Labs      10/27/18   0826  10/28/18   0616  10/29/18   0620   WBC  8.3  7.1  7.0   RBC  2.67*  2.71*  2.93*   HEMOGLOBIN  8.3*  8.4*  9.3*   HEMATOCRIT  24.7*  26.7*  27.0*   MCV  92.5  98.5*  92.2   MCH  31.1  31.0  31.7   MCHC  33.6  31.5*  34.4   RDW  42.6  46.1  42.7   PLATELETCT  284  263  372   MPV  10.0  10.2  9.5       Active Hospital Problems    Diagnosis   • Epigastric pain [R10.13]     Priority: High            • Adult failure to thrive [R62.7]     Priority: High   • Intertrochanteric fracture of left femur, closed, initial encounter ([facility]) [S72.142A]     Priority: High     GLF (10/22/18), patient states she tripped over her dog in the kitchen and fell  Her son's girlfriend found her and called EMS, patient arrived to [facility] ED via flight care  During flight, Ems witnessed a vasovagal syncopal episode.   XR showed likely communited (L) intertrochanteric fracture     • Dementia [F03.90]     Priority: Medium   • Hypokalemia [E87.6]     Priority: Medium   • Urinary incontinence [R32]     Priority: Low "   • Essential hypertension, benign [I10]     Priority: Low   • Normocytic normochromic anemia [D64.9]     Priority: Low   • Dyslipidemia [E78.5]     Priority: Low   • Aortic systolic murmur on examination [I35.8]     Priority: Low   • Starvation ketoacidosis [E87.2]     UA (10/28/18): >=160 ketones, no UTI; with elevated AG   Gave D5NS with 40meq KCl, AG normal   -Continue to monitor K        Assessment/Plan:  Patient doing well  Dressing change by nursing today ordered and discussed with nurse  POD#6 S/P Intramedullary nailing of left hip  Wt bearing status - WBAT  Wound care/Drains - dressing CDI  Future Procedures - none planend   Lovenox: Start 10/24, Duration-until ambulatory > 150'  Sutures/Staples out- 10-14 days post operatively  PT/OT-initiated  Antibiotics: completed  DVT Prophylaxis- TEDS/SCDs/Foot pumps  Mcmanus-none  Case Coordination for Discharge Planning - Disposition SNF

## 2018-10-29 NOTE — PROGRESS NOTES
2 RN skin check completed.  Pt has sacral pressure ulcer, dressing replaced as it was falling off. Blanchable but purple.  Mepilex to upper back pressure sore in place, CDI.  Surgical incisions to L hip/leg has dressings that are CDI.  Heels are red but blanchable and floated on pillows.  Q2H turns in place, pt on low air loss mattress as well.

## 2018-10-29 NOTE — WOUND TEAM
[facility]  Inpatient Services  Initial Wound and Skin Care Evaluation    Admission Date: 10/22/2018     Consult Date: 10/26/18 @ 1700   HPI, PMH, SH: Reviewed    Unit where seen by Wound Team: [id number]     WOUND CONSULT RELATED TO:  Sacral discoloration     SUBJECTIVE:  Pt anxious      Self Report / Pain Level:  Pt tensing with bed mobility       OBJECTIVE:  Pt in bed, A to roll, sacrum JEAN-CLAUDE    WOUND TYPE, LOCATION, CHARACTERISTICS (Pressure Injuries: location, stage, POA or date identified)                  Wound 10/26/18 Partial Thickness Wound Buttocks open area, non-blanchable (Active)   Site Assessment Light purple;Excoriated 10/28/2018  8:20 PM   Deepali-wound Assessment Pink 10/28/2018  8:20 PM   Margins Undefined edges 10/28/2018  8:20 PM   Wound Length (cm) 2.5 cm 10/27/2018  5:00 PM   Wound Width (cm) 2.1 cm 10/27/2018  5:00 PM   Wound Surface Area (cm^2) 3 cm^2 10/27/2018  5:00 PM   Drainage Amount None 10/28/2018  8:20 PM   Non-staged Wound Description Partial thickness 10/28/2018  8:20 PM   Treatments Site care 10/27/2018  5:00 PM   Cleansing Not Applicable 10/28/2018  9:00 AM   Periwound Protectant Moisture Barrier;Skin Protectant wipes to Periwound 10/28/2018  9:00 AM   Dressing Options Barrier paste 10/28/2018  8:20 PM   Dressing Changed  10/28/2018  8:20 PM   Dressing Status  10/28/2018  8:20 PM   Dressing Change Frequency q shift 10/28/2018  8:20 PM   NEXT Dressing Change  10/27/18 10/27/2018  5:00 PM   NEXT Weekly Photo (Inpatient Only) 11/02/18 10/27/2018  5:00 PM   WOUND NURSE ONLY - Odor None 10/27/2018  5:00 PM   WOUND NURSE ONLY - Exposed Structures None 10/27/2018  5:00 PM   WOUND NURSE ONLY - Tissue Type and Percentage Purple 10/27/2018  5:00 PM   WOUND NURSE ONLY - Time Spent with Patient (mins) 60 10/27/2018  5:00 PM               Lab Values:    WBC:       WBC   Date/Time Value Ref Range Status   10/28/2018 06:16 AM 7.1 4.8 - 10.8 K/uL Final     AIC:    No results found for:  HBA1C        INTERVENTIONS BY WOUND TEAM:  Sacrum cleansed with wipes, moisture barrier applied, JANICE ordered    Dressing selection:  Barrier paste ordered         Interdisciplinary consultation: Patient, Bedside RN (),     EVALUATION: pt with small sDTI, off loading measures taken, nursing aware and turning pt    Factors affecting wound healing: immobility, cognition  Goals: Steady decrease in wound area and depth weekly.    NURSING PLAN OF CARE ORDERS (X):    Dressing changes: See Dressing Care orders:   Skin care: See Skin Care orders: X  Rectal tube care: See Rectal Tube Care orders:   Other orders:    RSKIN: CURRENT (X) ORDERED (O):   Q shift Kg:  X  Q shift pressure point assessments:  X  Pressure redistribution mattress            JANICE      X    Bariatric JANICE         Bariatric foam           Heel float boots          Float Heels off Bed with Pillows               Barrier wipes         Barrier Cream         Barrier paste      X    Sacral silicone dressing         Silicone O2 tubing         Anchorfast         Cannula fixation Device (Tender )          Gray Foam Ear protectors           Trach with Optifoam split foam                 Waffle cushion        Waffle Overlay         Rectal tube or BMS         Antifungal tx      Interdry          Turn q 2 hours    X    Up to chair        Ambulate  X    PT/OT     X   Dietician        Diabetes Education      PO     TF     TPN     NPO   # days   Other        WOUND TEAM PLAN OF CARE (X):   NPWT change 3 x week:        Dressing changes by wound team:       Follow up as needed:   X    Other (explain):     Anticipated discharge plans (X):   SNF:         X with ongoing site protection  Home Care:           Outpatient Wound Center:            Self Care:            Other:

## 2018-10-29 NOTE — CARE PLAN
Problem: Safety  Goal: Will remain free from falls  Outcome: PROGRESSING AS EXPECTED  Fall precautions in place     Problem: Urinary Elimination:  Goal: Ability to reestablish a normal urinary elimination pattern will improve  Outcome: PROGRESSING SLOWER THAN EXPECTED  Mcmanus placed on day shift today.

## 2018-10-29 NOTE — PROGRESS NOTES
· 2 RN skin check complete.   · Devices in place madhavi raymundo.  · Heels red, blanching.   · Skin assessed under devices yes/intact.  · Confirmed pressure ulcers found on left buttocks, upper back.  · New potential pressure ulcers noted on N/A  · The following interventions in place mepilex on upper back, dressings to LLE changed. Barrier bream applied to sacrum. Q2H turns in place. Heels floated. Low air loss mattress in use.

## 2018-10-29 NOTE — DISCHARGE PLANNING
Anticipated Discharge Disposition: SNF for skilled therapies.    Action: LESLIE CORRIGAN notified Dr. Horn of accepting SNf, [facility], and that pt has been cleared for dc to snf by surgery.    Barriers to Discharge: Medical clearance.    Plan: RN CM to notify [facility] and team when pt is medically clear.

## 2018-10-29 NOTE — PROGRESS NOTES
After hanging 4th potassium bag as scheduled, nurse noticed IV was ripped out of patient's R FA. Gown was slightly wet as was blanket but it did not seem as if pt missed too much of the medication. New IV started and potassium continued.

## 2018-10-29 NOTE — WOUND TEAM
"[facility]  Inpatient Services  Wound and Skin Care Progress Note    Admission Date:  10/22/2018   HPI, PMH, SH: Reviewed  Unit where seen by Wound Team: [id number]    WOUND TEAM FOLLOW UP: back wound    SUBJECTIVE:   \"Okay.\"    Self Report / Pain Level: crying with pain from RUE      OBJECTIVE: on JANICE bed, heels floated off pillows  WOUND TYPE, LOCATION, CHARACTERISTICS (Pressure ulcers: location, stage, POA or date identified)  Wound 10/28/18 Back (Active)   Site Assessment Clean;Dry;Pink;Red    Deepali-wound Assessment Pink    Margins Attached edges    Wound Length (cm) 1 cm    Wound Width (cm) 0.5 cm    Wound Surface Area (cm^2) 0.5 cm^2    Drainage Amount None    Non-staged Wound Description Partial thickness    Treatments Cleansed    Cleansing Normal Saline Irrigation    Dressing Options Mepilex    Dressing Cleansing/Solutions Not Applicable    Dressing Changed Other (Comment)    Dressing Status Clean;Dry;Intact    Dressing Change Frequency Every 72 hrs    NEXT Dressing Change  10/31/18    Odor None     Exposed Structures None     Tissue Type and Percentage 100% red scab      Vascular:  Wounds not r/t vascular problem    Lab Values:    WBC:       WBC   Date/Time Value Ref Range Status   10/28/2018 06:16 AM 7.1 4.8 - 10.8 K/uL Final     AIC:    No results found for: HBA1C       Culture:   na    INTERVENTIONS BY WOUND TEAM: pt on bedpan, removed and turned to R side. Viewed and palpated wound on back, cleaned with NS, dried and applied mepilex. Cleaned L coccyx wound with NS, dried. Applied piece of hydrocolloid over wound, secured with tegaderm. Positioned R side lying.  Dressing Options: Mepilex      Interdisciplinary consultation:   With Nursing;With Patient    EVALUATION:  Mepilex placed on back wound for protection. Thin hydrocolloid over L coccyx since JEAN-CLAUDE and wasn't aware another wound team member saw pt for coccyx wound see their note.  Thin hydrocolloid to help with moist wound healing. "       Factors affecting wound healing: decreased mobility, HTN, failure to thrive, anemia, decreased nutrition  Goals:  Steady decrease in wound area and depth weekly    NURSING PLAN OF CARE:    Dressing changes: Continue previous Dressing Maintenance orders:        See new Dressing Maintenance orders:    For back   Skin care: See Skin Care orders:        Rectal tube care: See Rectal Tube Care orders:      Other orders:           WOUND TEAM PLAN OF CARE (X):   NPWT change 3 x week:        Dressing changes:       Follow up as needed:   x    Other:

## 2018-10-29 NOTE — PROGRESS NOTES
Received report from Day RN, assumed care at 1915.  Pt oriented to self, very confused, calls out in pain only when someone else goes in room.  Changed dressing on sacral pressure sore, as it was falling off. mepilex on upper back still in place.  Marietta vargas DD. Had BM on day shift today 10/28/2018.  Surgical incisions CDI on L hip.   Call light within reach, Q2H turns in place.

## 2018-10-29 NOTE — PROGRESS NOTES
"   Orthopaedic Progress Note    Interval changes:  Patient doing well  Cleared for DC to SNF by ortho pending medicine clearance    ROS - Patient denies any new issues.  Pain well controlled.    Blood pressure (!) 165/69, pulse 63, temperature 36.7 °C (98.1 °F), resp. rate 16, height 1.575 m (5' 2\"), weight 52.3 kg (115 lb 4.8 oz), SpO2 94 %, not currently breastfeeding.    Patient seen and examined  No acute distress  Breathing non labored  RRR  Left hip dressings changed, surgical incisions are well approximated and are dry and clean.  There is no erythema, induration, or signs of infection at any of the incision sites. Patient clearly fires tibialis anterior, EHL, and gastrocnemius/soleus. Sensation is intact to light touch throughout superficial peroneal, deep peroneal, tibial, saphenous, and sural nerve distributions. Strong and palpable 2+ dorsalis pedis and posterior tibial pulses with capillary refill less than 2 seconds. No lower leg tenderness or discomfort.     Recent Labs      10/27/18   0826  10/28/18   0616   WBC  8.3  7.1   RBC  2.67*  2.71*   HEMOGLOBIN  8.3*  8.4*   HEMATOCRIT  24.7*  26.7*   MCV  92.5  98.5*   MCH  31.1  31.0   MCHC  33.6  31.5*   RDW  42.6  46.1   PLATELETCT  284  263   MPV  10.0  10.2       Active Hospital Problems    Diagnosis   • Adult failure to thrive [R62.7]     Priority: High   • Intertrochanteric fracture of left femur, closed, initial encounter ([facility]) [S75.142A]     Priority: High     GLF (10/22/18), patient states she tripped over her dog in the kitchen and fell  Her son's girlfriend found her and called EMS, patient arrived to [facility] ED via flight care  During flight, Ems witnessed a vasovagal syncopal episode.   XR showed likely communited (L) intertrochanteric fracture     • Dementia [F03.90]     Priority: Medium   • Hypokalemia [E87.6]     Priority: Medium   • Essential hypertension, benign [I10]     Priority: Low   • Normocytic normochromic anemia [D64.9]     " Priority: Low   • Dyslipidemia [E78.5]     Priority: Low   • Aortic systolic murmur on examination [I35.8]     Priority: Low   • Urinary incontinence [R32]   • Starvation ketoacidosis [E87.2]     Assessment/Plan:  Patient doing well  POD#5 S/P Intramedullary nailing of left hip  Wt bearing status - WBAT  Wound care/Drains - dressing CDI  Future Procedures - none planend   Lovenox: Start 10/24, Duration-until ambulatory > 150'  Sutures/Staples out- 10-14 days post operatively  PT/OT-initiated  Antibiotics: completed  DVT Prophylaxis- TEDS/SCDs/Foot pumps  Mcmanus-none  Case Coordination for Discharge Planning - Disposition SNF

## 2018-10-29 NOTE — PROGRESS NOTES
Internal Medicine Interval Note  Note Author: Lisa Horn M.D.     Name Madhavi Montiel 1935   Age/Sex 83 y.o. female   MRN [id number]   Code Status FULL     After 5PM or if no immediate response to page, please call for cross-coverage  Attending/Team: Dr. Doron Jacobs/Vladimir See Patient List for primary contact information  Call [phone] to page    1st Call - Day Intern (R1):   Dr. Lisa Horn  2nd Call - Day Sr. Resident (R2/R3):   Dr. Ahsan Stallings      Reason for interval visit  (Principal Problem)   Intertrochanteric fracture of left femur, closed, initial encounter (HCC)    Interval Problem Daily Status Update  (24 hours, problem oriented, brief subjective history, new lab/imaging data pertinent to that problem)     Patient complaining of epigastric pain, which is new since admission. She cannot identify any triggering or relieving factors. She has been having regular bowel movements, 2x today, and once yesterday. No nausea/vomiting, abdominal distension, diarrhea, constipation, fever/chills. On physical exam, abdomen is schaphoid with normoactive bowel sounds, soft, mild tenderness of the epigastric region, however on repeat palpation, no tenderness. No guarding, rigidity. Abdominal XR was done yesterday, which did not show any distention of the bowels.     Patient had an elevated AG and ketonuria yesterday, with a history of poor oral intake. She was started on D5NS with KCl, which improved her AG. She still has some hypokalemia today, however, improved. Will continue infusion with K repletion and monitoring.     Review of Systems   Unable to perform ROS: Dementia     Disposition/Barriers to discharge:   Guarded     Consultants/Specialty  Dr. River Castlilo/Orthopedic Surgery   PCP: No primary care provider on file.    Quality Measures  Quality-Core Measures   Reviewed items::  Labs reviewed and Medications reviewed  Mcmanus catheter::  Neurogenic Bladder  DVT prophylaxis  pharmacological::  Enoxaparin (Lovenox)  DVT prophylaxis - mechanical:  SCDs  Ulcer Prophylaxis::  Yes    Physical Exam     Vitals:    10/28/18 1435 10/28/18 1940 10/29/18 0550 10/29/18 0750   BP: (!) 165/74 159/83 (!) 181/93 144/85   Pulse: 89 75 83 89   Resp: 19 17 17 20   Temp: 36.2 °C (97.2 °F) 36.1 °C (96.9 °F) 36.3 °C (97.3 °F) 36.1 °C (97 °F)   SpO2: 91% 93% 91% 96%   Weight:       Height:         Body mass index is 21.09 kg/m².    Oxygen Therapy:  Pulse Oximetry: 96 %, O2 (LPM): 0, O2 Delivery: None (Room Air)    Physical Exam   Constitutional:   Thin, frail, elderly female    HENT:   Head: Normocephalic and atraumatic.   Mouth/Throat: Oropharynx is clear and moist. No oropharyngeal exudate.   Eyes: EOM are normal.   Neck: Normal range of motion.   Cardiovascular: Normal rate, regular rhythm, normal heart sounds and intact distal pulses.    Pulmonary/Chest: Effort normal and breath sounds normal. No respiratory distress. She has no wheezes. She has no rales.   Abdominal: Soft. Bowel sounds are normal. She exhibits no distension and no mass. There is tenderness in the epigastric area. There is no rebound and no guarding.   Scaphoid abdomen    Musculoskeletal: Normal range of motion.   Lymphadenopathy:     She has no cervical adenopathy.   Neurological: She is alert.   Oriented only to self    Skin: Skin is warm and dry.   Psychiatric: Affect and judgment normal.   Nursing note and vitals reviewed.    Assessment/Plan     * Intertrochanteric fracture of left femur, closed, initial encounter (HCC)- (present on admission)   Overview    GLF (10/22/18), patient states she tripped over her dog in the kitchen and fell  Her son's girlfriend found her and called EMS, patient arrived to [facility] ED via flight care  During flight, Ems witnessed a vasovagal syncopal episode.   XR showed likely communited (L) intertrochanteric fracture     Assessment & Plan    Surgery was done with intramedullary nailing of left hip, with no  complications.  Plan  -Continue home pain management, prn meds   -PT -recommended 3 times a week PT and for placement to SNF   -OT-recommended 4 times a week OT and for placement to SNF   -Continue DVT prophylaxis        Epigastric pain   Assessment & Plan    Patient complaining of epigastric pain, new since admission; has been having regular bowel movements (2x today, 1 yesterday, brown in color, no blood)   Physical exam: not distended, normoactive bowel sounds, soft, (+) tenderness at epigastric area, no rebound/rigidity  Abdominal XR (10/28/18): AP view the abdomen demonstrates scattered loops of air-filled bowel. No significant bowel distention is identified. There are vascular calcifications. There is spinal curvature with degenerative change. A left femur repair is partially visualized.  RUQ US (10/29/18): Small right pleural effusion. Mild dilatation of the pancreatic duct, can be age-related or associated with biliary ductal obstruction from stone, stenosis, or ampullary lesion. Could be further evaluated with ERCP or MRCP as clinically appropriate.  -Start PPI, GI cocktail, monitor response   -Scheduled anti-constipation meds   -Give potassium via IV   -For lipase   -For repeat abdominal XR         Adult failure to thrive   Assessment & Plan    Patient is not recovering well following surgery  She has not been eating regularly  Mobility and getting out of bed has been very difficult   -One-on-one feeding, continue full liquid diet   -Repletion of electrolytes as needed         Dementia   Assessment & Plan    -Patient has history of dementia, beginning ~2013 when her  passed away.   -Questionable history from patient, unsure of living situation   -Currently on aricept 5mg         Hypokalemia- (present on admission)   Assessment & Plan    Likely due to poor oral intake  K= 3.2, improved after oral KCl  -Continue to replete potassium as needed   -Monitor CMP         Urinary incontinence   Assessment &  Plan    Patient has been voiding 15-20 times daily mostly in small amounts  -Cont Mcmanus cath (patient has skin breakdown at the buttock area with urinary incontinence and difficulty moving due to chronic pain)  -Continue to monitor        Aortic systolic murmur on examination- (present on admission)   Assessment & Plan    Ejection systolic murmur at aortic area radiated to both carotids on examination, aortic stenosis murmur.  Records were obtained from her PCP who she last saw her in April 2018 with referral to a cardiologist, however no records of actually seeing the cardiologist.  Unable to obtain a reliable history from the patient.   No cardiovascular symptoms at current time.  Echo done with no pertinent findings EF: 65%  -Observe         Dyslipidemia- (present on admission)   Assessment & Plan    Chronic condition    Continue atorvastatin        Normocytic normochromic anemia- (present on admission)   Assessment & Plan    Hemoglobin stable.  Most likely due to blood loss during operation on IV fluid.  Asymptomatic.  No signs of active bleeding    Plan  -Continue to monitor hemodynamic status  -Transfuse for Hgb < 7.0        Essential hypertension, benign- (present on admission)   Assessment & Plan    -Continue metoprolol  -Continue lisinopril  -prn hydralazine for BP >180/110   -Monitor

## 2018-10-29 NOTE — DISCHARGE SUMMARY
Internal Medicine Discharge Summary  Note Author: Ahsan Stallings M.D.       Admit Date:  10/22/2018       Discharge Date:  10/30/2018    Service:   [facility] Internal Medicine white Team  Attending Physician(s):   Dr. Lopez     Senior Resident(s):   Dr. Stallings  Josué Resident(s):   Dr. Horn  PCP: No primary care provider on file.      Primary Diagnosis:     comminuted LEFT proximal femur intertrochanteric fracture, resolved    Secondary Diagnoses:                    Epigastric pain POA: No, resolved       Hypokalemia POA: Yes, resolved    Dementia POA: Unknown    Essential hypertension, benign POA: Yes, not resolved    Normocytic normochromic anemia POA: Yes, not resolved     Dyslipidemia POA: Yes, not resolved     Aortic systolic murmur on examination POA: Yes    Urinary incontinence POA: Unknown, not resolved     Starvation ketoacidosis POA: Unknown, resolved         Hospital Summary (Brief Narrative):         83-year-old lady with history of Alzheimer's disease, hypertension, and chronic back pain presented with mechanical fall after she tripped in her kitchen resulting in left proximal femur intertrochanteric fracture.  The patient is demented and not to provide good history but she was responding appropriately to  Questions. her vitals were stable on admission apart from bradycardia, her left leg looks shorter and externally rotated on examination, no ecchymosis or swelling on examination, no neurovascular injury.  Imaging showed Mildly displaced and likely comminuted LEFT proximal femur intertrochanteric fracture.  Orthopedic was consulted, and procedure was done with Intramedullary nailing of left hip.  Her pain was managed with pain medication, and started on DVT prophylaxis with enoxaparin subcut, Start 10/24, Duration-until ambulatory.  PT/OT were following    During her course of hospitalization, patient developed some dehydration, hypokalemia and some starvation ketoacidosis, was treated with  IV fluid.  Also she developed constipation and managed with laxatives.  During her hospitalization the patient was having some urinary incontinence and she had skin breakdown at her sacral area and buttocks, needed to place a Mcmanus's catheter for skin protection.  Discharged on this for his catheter, this needs to be followed in SNF  Needs follow up with orthopedics.  Needs to continue on enoxaparin to continue until becomes ambulatory. Discharged to SNF        Patient /Hospital Summary (Details -- Problem Oriented) :          comminuted LEFT proximal femur intertrochanteric fracture.   See hospital summary          Adult failure to thrive       The patient was not eating well and there was some delay in recovery after the surgery. We contacted her son and he mentioned that this is her baseline     Dementia       Hx of alzheimer dementia  -Currently on aricept 5mg         Hypokalemia       Likely due to poor oral intake  Monitored and repleted        Urinary incontinence       Due to her dementia.  Mcmanus's catheter was placed.  Needs to be followed in SNF.        Aortic systolic murmur on examination       most likely aortic sclerosis.        Dyslipidemia       Continued on atorvastatin      Normocytic normochromic anemia       Hemoglobin stable.  Most likely due to blood loss during operation on IV fluid.  Asymptomatic.  No signs of active bleeding  She did not get a blood transfusion.  We monitored her hemoglobin        Essential hypertension, benign       -Metoprolol was stopped initially due to bradycardia, then we restarted her metoprolol.   Hydralazine as needed was added.  Her blood pressure was elevated during hospitalization.  Need to increase lisinopril to 40 mg daily.  Discharged on this dose.            Consultants:       orthopedics    Procedures:          Intramedullary nailing of left hip.    Imaging/ Testing:          Discharge Medications:         Medication Reconciliation: Completed             Madhavi Montiel   Home Medication Instructions NORBERTO:[id number]    Printed on:10/30/18 9927   Medication Information                      atorvastatin (LIPITOR) 40 MG Tab  Take 40 mg by mouth every evening.             buPROPion SR (WELLBUTRIN-SR) 150 MG TABLET SR 12 HR sustained-release tablet  Take 150 mg by mouth every day.             citalopram (CELEXA) 20 MG Tab  Take 20 mg by mouth every day.             donepezil (ARICEPT) 5 MG Tab  Take 5 mg by mouth every evening.             enoxaparin (LOVENOX) 40 MG/0.4ML Solution inj  Inject 40 mg as instructed every day.             HYDROcodone/acetaminophen (NORCO)  MG Tab  Take 1 Tab by mouth every 6 hours as needed for Moderate Pain.             lisinopril (PRINIVIL, ZESTRIL) 40 MG tablet  Take 1 Tab by mouth every day.             metoprolol SR (TOPROL XL) 25 MG TABLET SR 24 HR  Take 25 mg by mouth every day.             omeprazole (PRILOSEC) 40 MG delayed-release capsule  Take 40 mg by mouth every day.             polyethylene glycol/lytes (MIRALAX) Pack  Take 1 Packet by mouth 1 time daily as needed (if sennosides and docusate ineffective after 24 hours).             senna-docusate (PERICOLACE OR SENOKOT S) 8.6-50 MG Tab  Take 2 Tabs by mouth 2 times a day as needed for Constipation.             traZODone (DESYREL) 50 MG Tab  Take 50 mg by mouth every bedtime.                 Disposition:   Skilled nursing facility    Diet:   She is on full liquid diet per speech pathologist due to some dysphagia.  This needs to be followed    Activity:   Avoid weightbearing to left foot    Instructions:         The patient was instructed to return to the ER in the event of worsening symptoms. I have counseled the patient on the importance of compliance and the patient has agreed to proceed with all medical recommendations and follow up plan indicated above.   The patient understands that all medications come with benefits and risks. Risks may include permanent injury or death  and these risks can be minimized with close reassessment and monitoring.        Primary Care Provider:     Discharge summary faxed to primary care provider:  Deferred  Copy of discharge summary given to the patient: Deferred      Follow up appointment details :        Follow-up with orthopedics.  He is to continue on subcutaneous enoxaparin until becomes ambulatory  Follow-up for her anemia  Follow-up for the Mcmanus's catheter    Pending Studies:            Time spent on discharge day patient visit, preparing discharge paperwork and arranging for patient follow up.    Summary of follow up issues:   Follow-up with orthopedics.  He is to continue on subcutaneous enoxaparin until becomes ambulatory  Follow-up for her anemia  Follow-up for the Mcmanus's catheter    Discharge Time (Minutes) :    40 min  Hospital Course Type:  Inpatient Stay >2 midnights      Condition on Discharge  : Stable, alert but she is demented  ______________________________________________________________________    Interval history/exam for day of discharge:        Vitals:    10/28/18 1435 10/28/18 1940 10/29/18 0550 10/29/18 0750   BP: (!) 165/74 159/83 (!) 181/93 144/85   Pulse: 89 75 83 89   Resp: 19 17 17 20   Temp: 36.2 °C (97.2 °F) 36.1 °C (96.9 °F) 36.3 °C (97.3 °F) 36.1 °C (97 °F)   SpO2: 91% 93% 91% 96%   Weight:       Height:         Weight/BMI: Body mass index is 21.09 kg/m².  Pulse Oximetry: 96 %, O2 (LPM): 0, O2 Delivery: None (Room Air)    General: Not in acute distress,  CVS: S1, S2, ejection systolic murmur at aortic area, no radiation, no rub or gallop  PULM: Clear to auscultation, harsh vesicular breathing, no wheeze or crackles    Wounds on left  femur are clean.  Pulses and peripheral nerves are intact    Most Recent Labs:    Lab Results   Component Value Date/Time    WBC 7.0 10/29/2018 06:20 AM    RBC 2.93 (L) 10/29/2018 06:20 AM    HEMOGLOBIN 9.3 (L) 10/29/2018 06:20 AM    HEMATOCRIT 27.0 (L) 10/29/2018 06:20 AM    MCV 92.2  10/29/2018 06:20 AM    MCH 31.7 10/29/2018 06:20 AM    MCHC 34.4 10/29/2018 06:20 AM    MPV 9.5 10/29/2018 06:20 AM    NEUTSPOLYS 79.20 (H) 10/23/2018 06:17 AM    LYMPHOCYTES 11.90 (L) 10/23/2018 06:17 AM    MONOCYTES 7.50 10/23/2018 06:17 AM    EOSINOPHILS 0.40 10/23/2018 06:17 AM    BASOPHILS 0.60 10/23/2018 06:17 AM      Lab Results   Component Value Date/Time    SODIUM 138 10/29/2018 02:00 PM    POTASSIUM 3.6 10/29/2018 02:00 PM    CHLORIDE 103 10/29/2018 02:00 PM    CO2 27 10/29/2018 02:00 PM    GLUCOSE 142 (H) 10/29/2018 02:00 PM    BUN 5 (L) 10/29/2018 02:00 PM    CREATININE 0.42 (L) 10/29/2018 02:00 PM      Lab Results   Component Value Date/Time    ALTSGPT 10 10/29/2018 02:00 PM    ASTSGOT 16 10/29/2018 02:00 PM    ALKPHOSPHAT 57 10/29/2018 02:00 PM    TBILIRUBIN 1.4 10/29/2018 02:00 PM    LIPASE 8 (L) 10/29/2018 02:00 PM    ALBUMIN 3.2 10/29/2018 02:00 PM    GLOBULIN 2.3 10/29/2018 02:00 PM    INR 1.10 10/23/2018 08:19 AM     Lab Results   Component Value Date/Time    PROTHROMBTM 14.3 10/23/2018 08:19 AM    INR 1.10 10/23/2018 08:19 AM

## 2018-10-30 VITALS
HEIGHT: 62 IN | OXYGEN SATURATION: 97 % | HEART RATE: 69 BPM | BODY MASS INDEX: 21.22 KG/M2 | TEMPERATURE: 98.1 F | SYSTOLIC BLOOD PRESSURE: 144 MMHG | RESPIRATION RATE: 16 BRPM | WEIGHT: 115.3 LBS | DIASTOLIC BLOOD PRESSURE: 79 MMHG

## 2018-10-30 PROBLEM — E87.29 STARVATION KETOACIDOSIS: Status: RESOLVED | Noted: 2018-10-29 | Resolved: 2018-10-30

## 2018-10-30 PROBLEM — R10.13 EPIGASTRIC PAIN: Status: RESOLVED | Noted: 2018-10-29 | Resolved: 2018-10-30

## 2018-10-30 PROBLEM — E87.6 HYPOKALEMIA: Status: RESOLVED | Noted: 2018-10-23 | Resolved: 2018-10-30

## 2018-10-30 PROBLEM — T73.0XXA STARVATION KETOACIDOSIS: Status: RESOLVED | Noted: 2018-10-29 | Resolved: 2018-10-30

## 2018-10-30 LAB
ALBUMIN SERPL BCP-MCNC: 3.1 G/DL (ref 3.2–4.9)
ALBUMIN/GLOB SERPL: 1.4 G/DL
ALP SERPL-CCNC: 56 U/L (ref 30–99)
ALT SERPL-CCNC: 10 U/L (ref 2–50)
ANION GAP SERPL CALC-SCNC: 7 MMOL/L (ref 0–11.9)
AST SERPL-CCNC: 18 U/L (ref 12–45)
BILIRUB SERPL-MCNC: 1.2 MG/DL (ref 0.1–1.5)
BUN SERPL-MCNC: 4 MG/DL (ref 8–22)
CALCIUM SERPL-MCNC: 8.5 MG/DL (ref 8.5–10.5)
CHLORIDE SERPL-SCNC: 106 MMOL/L (ref 96–112)
CO2 SERPL-SCNC: 26 MMOL/L (ref 20–33)
CREAT SERPL-MCNC: 0.42 MG/DL (ref 0.5–1.4)
ERYTHROCYTE [DISTWIDTH] IN BLOOD BY AUTOMATED COUNT: 43.8 FL (ref 35.9–50)
GLOBULIN SER CALC-MCNC: 2.2 G/DL (ref 1.9–3.5)
GLUCOSE SERPL-MCNC: 136 MG/DL (ref 65–99)
HCT VFR BLD AUTO: 24.3 % (ref 37–47)
HGB BLD-MCNC: 8.1 G/DL (ref 12–16)
MCH RBC QN AUTO: 31.4 PG (ref 27–33)
MCHC RBC AUTO-ENTMCNC: 33.3 G/DL (ref 33.6–35)
MCV RBC AUTO: 94.2 FL (ref 81.4–97.8)
PLATELET # BLD AUTO: 343 K/UL (ref 164–446)
PMV BLD AUTO: 9.5 FL (ref 9–12.9)
POTASSIUM SERPL-SCNC: 4.1 MMOL/L (ref 3.6–5.5)
PROT SERPL-MCNC: 5.3 G/DL (ref 6–8.2)
RBC # BLD AUTO: 2.58 M/UL (ref 4.2–5.4)
SODIUM SERPL-SCNC: 139 MMOL/L (ref 135–145)
WBC # BLD AUTO: 6.4 K/UL (ref 4.8–10.8)

## 2018-10-30 PROCEDURE — 3E02340 INTRODUCTION OF INFLUENZA VACCINE INTO MUSCLE, PERCUTANEOUS APPROACH: ICD-10-PCS | Performed by: HOSPITALIST

## 2018-10-30 PROCEDURE — 700111 HCHG RX REV CODE 636 W/ 250 OVERRIDE (IP): Performed by: HOSPITALIST

## 2018-10-30 PROCEDURE — 700102 HCHG RX REV CODE 250 W/ 637 OVERRIDE(OP): Performed by: INTERNAL MEDICINE

## 2018-10-30 PROCEDURE — 99239 HOSP IP/OBS DSCHRG MGMT >30: CPT | Mod: GC | Performed by: HOSPITALIST

## 2018-10-30 PROCEDURE — 85027 COMPLETE CBC AUTOMATED: CPT

## 2018-10-30 PROCEDURE — 90471 IMMUNIZATION ADMIN: CPT

## 2018-10-30 PROCEDURE — A9270 NON-COVERED ITEM OR SERVICE: HCPCS | Performed by: STUDENT IN AN ORGANIZED HEALTH CARE EDUCATION/TRAINING PROGRAM

## 2018-10-30 PROCEDURE — A9270 NON-COVERED ITEM OR SERVICE: HCPCS | Performed by: INTERNAL MEDICINE

## 2018-10-30 PROCEDURE — 700111 HCHG RX REV CODE 636 W/ 250 OVERRIDE (IP): Performed by: STUDENT IN AN ORGANIZED HEALTH CARE EDUCATION/TRAINING PROGRAM

## 2018-10-30 PROCEDURE — 700111 HCHG RX REV CODE 636 W/ 250 OVERRIDE (IP): Performed by: INTERNAL MEDICINE

## 2018-10-30 PROCEDURE — 700105 HCHG RX REV CODE 258: Performed by: STUDENT IN AN ORGANIZED HEALTH CARE EDUCATION/TRAINING PROGRAM

## 2018-10-30 PROCEDURE — 90662 IIV NO PRSV INCREASED AG IM: CPT | Performed by: HOSPITALIST

## 2018-10-30 PROCEDURE — 700102 HCHG RX REV CODE 250 W/ 637 OVERRIDE(OP): Performed by: STUDENT IN AN ORGANIZED HEALTH CARE EDUCATION/TRAINING PROGRAM

## 2018-10-30 PROCEDURE — 80053 COMPREHEN METABOLIC PANEL: CPT

## 2018-10-30 PROCEDURE — 36415 COLL VENOUS BLD VENIPUNCTURE: CPT

## 2018-10-30 RX ORDER — HYDROCODONE BITARTRATE AND ACETAMINOPHEN 10; 325 MG/1; MG/1
1 TABLET ORAL EVERY 6 HOURS PRN
Qty: 20 TAB | Refills: 0 | Status: SHIPPED | OUTPATIENT
Start: 2018-10-30 | End: 2018-10-30

## 2018-10-30 RX ORDER — LISINOPRIL 40 MG/1
40 TABLET ORAL DAILY
Qty: 30 TAB
Start: 2018-10-31

## 2018-10-30 RX ORDER — DEXTROSE AND SODIUM CHLORIDE 5; .9 G/100ML; G/100ML
INJECTION, SOLUTION INTRAVENOUS CONTINUOUS
Status: DISCONTINUED | OUTPATIENT
Start: 2018-10-30 | End: 2018-10-30 | Stop reason: HOSPADM

## 2018-10-30 RX ORDER — MAGNESIUM SULFATE HEPTAHYDRATE 40 MG/ML
2 INJECTION, SOLUTION INTRAVENOUS ONCE
Status: COMPLETED | OUTPATIENT
Start: 2018-10-30 | End: 2018-10-30

## 2018-10-30 RX ORDER — POLYETHYLENE GLYCOL 3350 17 G/17G
17 POWDER, FOR SOLUTION ORAL
Refills: 3
Start: 2018-10-30

## 2018-10-30 RX ORDER — HYDROCODONE BITARTRATE AND ACETAMINOPHEN 10; 325 MG/1; MG/1
1 TABLET ORAL EVERY 6 HOURS PRN
Qty: 20 TAB | Refills: 0 | Status: SHIPPED | OUTPATIENT
Start: 2018-10-30 | End: 2018-11-02

## 2018-10-30 RX ORDER — AMLODIPINE BESYLATE 5 MG/1
5 TABLET ORAL
Status: CANCELLED | OUTPATIENT
Start: 2018-10-30

## 2018-10-30 RX ORDER — AMOXICILLIN 250 MG
2 CAPSULE ORAL 2 TIMES DAILY PRN
Qty: 30 TAB | Refills: 0 | Status: SHIPPED | OUTPATIENT
Start: 2018-10-30

## 2018-10-30 RX ADMIN — MORPHINE SULFATE 5 MG: 10 INJECTION INTRAVENOUS at 10:19

## 2018-10-30 RX ADMIN — INFLUENZA A VIRUS A/MICHIGAN/45/2015 X-275 (H1N1) ANTIGEN (FORMALDEHYDE INACTIVATED), INFLUENZA A VIRUS A/SINGAPORE/INFIMH-16-0019/2016 IVR-186 (H3N2) ANTIGEN (FORMALDEHYDE INACTIVATED), AND INFLUENZA B VIRUS B/MARYLAND/15/2016 BX-69A (A B/COLORADO/6/2017-LIKE VIRUS) ANTIGEN (FORMALDEHYDE INACTIVATED) 0.5 ML: 60; 60; 60 INJECTION, SUSPENSION INTRAMUSCULAR at 08:02

## 2018-10-30 RX ADMIN — HYDROCODONE BITARTRATE AND ACETAMINOPHEN 1 TABLET: 10; 325 TABLET ORAL at 14:04

## 2018-10-30 RX ADMIN — METOPROLOL SUCCINATE 25 MG: 25 TABLET, EXTENDED RELEASE ORAL at 05:51

## 2018-10-30 RX ADMIN — BUPROPION HYDROCHLORIDE 100 MG: 100 TABLET, FILM COATED, EXTENDED RELEASE ORAL at 05:51

## 2018-10-30 RX ADMIN — ENOXAPARIN SODIUM 40 MG: 100 INJECTION SUBCUTANEOUS at 05:50

## 2018-10-30 RX ADMIN — OMEPRAZOLE 40 MG: 20 CAPSULE, DELAYED RELEASE ORAL at 05:50

## 2018-10-30 RX ADMIN — HYDROCODONE BITARTRATE AND ACETAMINOPHEN 1 TABLET: 10; 325 TABLET ORAL at 02:50

## 2018-10-30 RX ADMIN — MAGNESIUM SULFATE IN WATER 2 G: 40 INJECTION, SOLUTION INTRAVENOUS at 08:01

## 2018-10-30 RX ADMIN — DEXTROSE AND SODIUM CHLORIDE: 5; 900 INJECTION, SOLUTION INTRAVENOUS at 08:00

## 2018-10-30 RX ADMIN — CITALOPRAM HYDROBROMIDE 20 MG: 20 TABLET ORAL at 05:51

## 2018-10-30 RX ADMIN — LISINOPRIL 40 MG: 20 TABLET ORAL at 05:51

## 2018-10-30 ASSESSMENT — PAIN SCALES - GENERAL
PAINLEVEL_OUTOF10: 6
PAINLEVEL_OUTOF10: 6
PAINLEVEL_OUTOF10: 10
PAINLEVEL_OUTOF10: 8
PAINLEVEL_OUTOF10: 10

## 2018-10-30 NOTE — DISCHARGE PLANNING
Anticipated Discharge Disposition: [facility] for skilled therapies.    Action: RN CM left voice message for son, Mayito, [phone], notifying him of pt's departure to [facility] today. RN CM also left address and phone number of facility in the message for the son.    Barriers to Discharge: None.    Plan: DC to [facility] when transportation arranged. Transportation papers faxed to [facility] at ext. [phone].

## 2018-10-30 NOTE — PROGRESS NOTES
· 2 RN skin check complete.   · Devices in place FC tube.  · Skin assessed under devices dry and intact.  · Confirmed pressure ulcers found on Partial thickness at right buttock hydrocolloid thin dressing in place.. Staples at the left femur mid lateral, upper left hip and left lower leg tega derm dressing per MD instruction.  · New potential pressure ulcers noted on NA.   · The following interventions in place Q2 turn, float heels with pillow, low air loss mattress, increase oral fluid and intake for nutrition/wound healing, fc for skin and Mepilex applied for protection.

## 2018-10-30 NOTE — CARE PLAN
Problem: Mobility  Goal: Risk for activity intolerance will decrease    Intervention: Encourage patient to increase activity level in collaboration with Interdisciplinary Team  Patient encouraged to sit in chair for meals and get out of bed to use of BSC.       Problem: Skin Integrity  Goal: Risk for impaired skin integrity will decrease    Intervention: Assess and monitor skin integrity, appearance and/or temperature  2RN skin check completed. Dressings to LLE, back, and sacrum are CDI. Patient turned q2h, patient incontinent at times, offerring bed pan frequently and providing linen change to ensure patient remains clean and dry.

## 2018-10-30 NOTE — THERAPY
"Speech Language Therapy Evaluation completed to address cognition  Functional Status:  Cognitive-linguistic evaluation completed. Patient presents with deficits in attention, immediate and long term personal memories, reasoning, sequencing, reading comprehension and executive functioning. She was perseverative on having a bowel movement or talking about her cat. Her attention to tasks was poor and she needed constant redirection to stay on task and topic. She continues to jerk in pain but to a lesser degree than what was observed during yesterday's clinical swallow evaluation. Reminiscing and conversing on topic she likes continues to assist in eliminating this jerking or crying out in pain behavior. recommendations.     Recommendations:  She is not safe for independent living and would benefit from assistance and supervision with ADLs and IADLs. Due to patient's baseline dementia, cognitive-linguistic treatment does not appear to be warranted. However, she may benefit from speech therapy in a longer term setting to establish strategies for memory and ADLs. Please see OT and PT notes regarding discharge     Plan of Care: Patient will be followed by SLP to address dysphagia.      See \"Rehab Therapy-Acute\" Patient Summary Report for complete documentation.   "

## 2018-10-30 NOTE — PROGRESS NOTES
PT discharged to [facility] with [facility]. [facility]'ed. Prescription for Norco provided. Discharge instructions involving diagnosis, medications, and controlled substances reviewed with patient's son, Mayito. 2RN verbalization received over telephone. AVS  placed in patient chart. Patient discharged with breanne per MD order. Patient's home medications retrieved from central pharmacy and given to [facility] staff. Patient very anxious about rings she said she was admitted with. This RN mentioned concern to Mayito, whom said patient's rings are at home, she should have only some clothing at bedside (only undergarments where found). Discharge transportation ID form completed and placed in chart.

## 2018-10-30 NOTE — CARE PLAN
Problem: Discharge Barriers/Planning  Goal: Patient's continuum of care needs will be met    Intervention: Involve patient and significant other/support system in setting and prioritizing goals for hospital stay and discharge  Possible discharge to   When medicaly over

## 2018-10-30 NOTE — DISCHARGE PLANNING
Received Transport Form at 1300  Spoke to Bibi at [facility]    Transport is scheduled for 10/30 at 1530 going to [facility].      Mariela at [facility] and RN MEENU Glass notified of transportation time.

## 2018-10-30 NOTE — PROGRESS NOTES
"Received alert and oriented x 2. Check vitals sign and recorded accordingly and due med given per MAR. Monitor sign and symptoms of respiratory distress and treatment given accordingly per MAR.Medicated per MAR and reassessed every 2 hours per protocol. Call light within reach. Bed alarm in placed.  Q2 turn. Needs attended. Will continue to monitor.BP (!) 191/74   Pulse 79   Temp 36.4 °C (97.5 °F)   Resp 18   Ht 1.575 m (5' 2\")   Wt 52.3 kg (115 lb 4.8 oz)   SpO2 91%   Breastfeeding? No   BMI 21.09 kg/m² .  "

## 2018-10-30 NOTE — DISCHARGE INSTRUCTIONS
Discharge Instructions    Discharged [facility] by ambulance with escort. Discharged via ambulance, hospital escort: Yes.  Special equipment needed: Not Applicable    Be sure to schedule a follow-up appointment with your primary care doctor or any specialists as instructed.     Discharge Plan:   Diet Plan: Discussed  Activity Level: Discussed  Confirmed Follow up Appointment: No (Comments) (discharged to SNF)  Confirmed Symptoms Management: Discussed  Medication Reconciliation Updated: No (Comments)  Pneumococcal Vaccine Administered/Refused: Not given - Patient refused pneumococcal vaccine  Influenza Vaccine Indication: Not indicated: Previously immunized this influenza season and > 8 years of age  Influenza Vaccine Given - only chart on this line when given: Influenza Vaccine Given (See MAR)    I understand that a diet low in cholesterol, fat, and sodium is recommended for good health. Unless I have been given specific instructions below for another diet, I accept this instruction as my diet prescription.   Other diet: Regular, full liquid; nectar thick    Special Instructions: None    · Is patient discharged on Warfarin / Coumadin?   No     Depression / Suicide Risk    As you are discharged from this [facility] facility, it is important to learn how to keep safe from harming yourself.    Recognize the warning signs:  · Abrupt changes in personality, positive or negative- including increase in energy   · Giving away possessions  · Change in eating patterns- significant weight changes-  positive or negative  · Change in sleeping patterns- unable to sleep or sleeping all the time   · Unwillingness or inability to communicate  · Depression  · Unusual sadness, discouragement and loneliness  · Talk of wanting to die  · Neglect of personal appearance   · Rebelliousness- reckless behavior  · Withdrawal from people/activities they love  · Confusion- inability to concentrate     If you or a loved one observes any  of these behaviors or has concerns about self-harm, here's what you can do:  · Talk about it- your feelings and reasons for harming yourself  · Remove any means that you might use to hurt yourself (examples: pills, rope, extension cords, firearm)  · Get professional help from the community (Mental Health, Substance Abuse, psychological counseling)  · Do not be alone:Call your Safe Contact- someone whom you trust who will be there for you.  · Call your local CRISIS HOTLINE 268-2508 or 744-522-3321  · Call your local Children's Mobile Crisis Response Team [place] (868) 131-3862 or www.uSpeak  · Call the toll free National Suicide Prevention Hotlines   · National Suicide Prevention Lifeline 641-124-NXTA (1382)  · National Hope Line Network 800-SUICIDE (058-0176)

## 2018-10-30 NOTE — PROGRESS NOTES
Spoke to sonMayito on the phone. States his mom has been deteriorating at home for the last 5 years. She has had low back pain that has been worked up, found to be non-operable years ago and has just been treated for pain with norco. She winces in pain every couple of seconds and minutes when she is awake at home. She also eats poorly at home, she often does not have an appetite. Her son is alright with her going to a SNF for recovery, however requested the name so he may do research on it ([facility]).     Extended time [id number]  60 additional minutes.  QUENTIN Jacobs MD

## 2018-10-30 NOTE — PROGRESS NOTES
Assumed patient care at 0700. Received report from night shift. Assessment completed. A&Ox1, self only. C/o 6/10 LLE pain, medicated per MAR. Mcmanus in place, draining to gravity. 2RN skin check completed. Q2h turns in place. Patient continuously removes dressings to LLE incisions, redressing as needed. Full liquid diet, poor appetite. Fall precautions in place; bed alarm on, personal possessions and call light placed within reach. Communication board updated.

## 2018-10-30 NOTE — PROGRESS NOTES
Internal Medicine Interval Note  Note Author: Lisa Horn M.D.     Name Madhavi Montiel 1935   Age/Sex 83 y.o. female   MRN [id number]   Code Status FULL     After 5PM or if no immediate response to page, please call for cross-coverage  Attending/Team: Dr. Doron Jacobs/Vladimir See Patient List for primary contact information  Call [phone] to page    1st Call - Day Intern (R1):   Dr. Lisa Horn  2nd Call - Day Sr. Resident (R2/R3):   Dr. Ahsan Stallings      Reason for interval visit  (Principal Problem)   Intertrochanteric fracture of left femur, closed, initial encounter (HCC)    Interval Problem Daily Status Update  (24 hours, problem oriented, brief subjective history, new lab/imaging data pertinent to that problem)   Patient is an 83-year-old frail demented elderly female who initially presented with a left intertrochanteric femur fracture.  She is postop day 7.  Yesterday she was complaining of epigastric pain which has since resolved with GI cocktail and restarting PPI.  Patient is doing well today and has her usual complaints of low back pain which has been present prior to admission.  Patient already has placement at [facility] for continued skilled nursing and PT/OT following her hip surgery.     Review of Systems   Unable to perform ROS: Dementia     Disposition/Barriers to discharge:   None - Discharge to SNF medically stable     Consultants/Specialty  Dr. River Castillo/orthopedic surgery  PCP: No primary care provider on file.    Quality Measures  Quality-Core Measures   Reviewed items::  Labs reviewed, Medications reviewed and Radiology images reviewed  DVT prophylaxis pharmacological::  Enoxaparin (Lovenox)  DVT prophylaxis - mechanical:  SCDs  Ulcer Prophylaxis::  Not indicated    Physical Exam     Vitals:    10/29/18 1935 10/29/18 2000 10/30/18 0515 10/30/18 0750   BP: (!) 191/74 156/75 157/77 144/79   Pulse: 79  76 69   Resp: 18  14 16   Temp: 36.4 °C (97.5 °F)  36.1  °C (97 °F) 36.7 °C (98.1 °F)   SpO2: 91%  92% 97%   Weight:       Height:         Body mass index is 21.09 kg/m².    Oxygen Therapy:  Pulse Oximetry: 97 %, O2 (LPM): 0, O2 Delivery: None (Room Air)    Physical Exam   Constitutional: She is well-developed, well-nourished, and in no distress.   Eyes: EOM are normal.   Neck: Normal range of motion.   Cardiovascular: Normal rate, regular rhythm, normal heart sounds and intact distal pulses.    Pulmonary/Chest: Effort normal and breath sounds normal. She has no wheezes. She has no rales.   Abdominal: Soft. Bowel sounds are normal. She exhibits no distension. There is no tenderness. There is no rebound and no guarding.   Scaphoid abdomen   Musculoskeletal: Normal range of motion. She exhibits no edema.   Lymphadenopathy:     She has no cervical adenopathy.   Neurological: She is alert.   Oriented only to self   Skin: Skin is warm and dry.   Nursing note and vitals reviewed.    Assessment/Plan     * Intertrochanteric fracture of left femur, closed, initial encounter ([facility])- (present on admission)   Overview    GL (10/22/18), patient states she tripped over her dog in the kitchen and fell  Her son's girlfriend found her and called EMS, patient arrived to [facility] ED via flight care  During flight, Ems witnessed a vasovagal syncopal episode.   XR showed likely communited (L) intertrochanteric fracture     Assessment & Plan    Surgery was done with intramedullary nailing of left hip, with no complications.  Plan  -Continue home pain management, prn meds   -Cont PT/OT at SNF   -Continue DVT prophylaxis until ambulatory >150'  -Sutures/Staples out- 10-14 days post operatively        Dementia   Assessment & Plan    -Patient has history of dementia, beginning ~2013 when her  passed away.   -Continue aricept 5mg         Urinary incontinence   Assessment & Plan    Patient has been voiding 15-20 times daily mostly in small amounts  -Cont Mcmanus cath (patient has skin breakdown  at the buttock area with urinary incontinence and difficulty moving due to chronic pain)  -Continue to monitor        Aortic systolic murmur on examination- (present on admission)   Assessment & Plan    Ejection systolic murmur at aortic area radiated to both carotids on examination, aortic stenosis murmur.  Records were obtained from her PCP who she last saw her in April 2018 with referral to a cardiologist, however no records of actually seeing the cardiologist.  Unable to obtain a reliable history from the patient.   No cardiovascular symptoms at current time.  Echo done with no pertinent findings EF: 65%  -Observe         Dyslipidemia- (present on admission)   Assessment & Plan    Chronic condition    Continue atorvastatin        Normocytic normochromic anemia- (present on admission)   Assessment & Plan    Hemoglobin stable.  Most likely due to blood loss during operation on IV fluid.  Asymptomatic.  No signs of active bleeding  -Continue to monitor hemodynamic status        Essential hypertension, benign- (present on admission)   Assessment & Plan    SBP at 140-150  -Continue metoprolol, lisinopril

## 2018-11-02 LAB
BACTERIA BLD CULT: NORMAL
BACTERIA BLD CULT: NORMAL
SIGNIFICANT IND 70042: NORMAL
SIGNIFICANT IND 70042: NORMAL
SITE SITE: NORMAL
SITE SITE: NORMAL
SOURCE SOURCE: NORMAL
SOURCE SOURCE: NORMAL

## 2018-12-28 LAB — EKG IMPRESSION: NORMAL

## 2020-01-03 NOTE — OR NURSING
Pt to recovery, sleeping, resps unlabored. Airway dc'd shortly after arrival. Medicated per MAR for reported discomfort in lower back. Pt tearful and somewhat anxious, states her cat just . Pt now reports pain improving. Denies nausea, tolerating sips of clears. Dressing to outer L hip / thigh CDI. Pedal pulse easily palpable. Pt disoriented to date and location. Reoriented as needed. VSS.   
RN tried to get consent from son (2 RN consent) however, the number on file is not a working number.    1503: Found the correct number and spoke with son on the phone, Mayito Montiel who gave consent for surgery and anesthesia to 2 RN's and also spoke on the phone to Dr. Conway.  
none

## (undated) DEVICE — GUIDE PIN CALIBRATED (5EA/PK) (4TX6=24)

## (undated) DEVICE — MASK ANESTHESIA ADULT  - (100/CA)

## (undated) DEVICE — TOWELS CLOTH SURGICAL - (4/PK 20PK/CA)

## (undated) DEVICE — NEPTUNE 4 PORT MANIFOLD - (20/PK)

## (undated) DEVICE — GLOVE BIOGEL ECLIPSE PF LATEX SIZE 8.0  (50PR/BX)

## (undated) DEVICE — DRAPE SURGICAL U 77X120 - (10/CA)

## (undated) DEVICE — PROTECTOR ULNA NERVE - (36PR/CA)

## (undated) DEVICE — SUTURE 0 VICRYL PLUS CT-1 - 8 X 18 INCH (12/BX)

## (undated) DEVICE — GOWN WARMING STANDARD FLEX - (30/CA)

## (undated) DEVICE — SENSOR SPO2 NEO LNCS ADHESIVE (20/BX) SEE USER NOTES

## (undated) DEVICE — PACK MAJOR ORTHO - (2EA/CA)

## (undated) DEVICE — KIT ROOM DECONTAMINATION

## (undated) DEVICE — LACTATED RINGERS INJ 1000 ML - (14EA/CA 60CA/PF)

## (undated) DEVICE — DRAPE LARGE 3 QUARTER - (20/CA)

## (undated) DEVICE — SET LEADWIRE 5 LEAD BEDSIDE DISPOSABLE ECG (1SET OF 5/EA)

## (undated) DEVICE — SLEEVE, VASO, THIGH, MED

## (undated) DEVICE — GLOVE BIOGEL INDICATOR SZ 8 SURGICAL PF LTX - (50/BX 4BX/CA)

## (undated) DEVICE — SUCTION INSTRUMENT YANKAUER BULBOUS TIP W/O VENT (50EA/CA)

## (undated) DEVICE — MASK AIRWAY SIZE 3 UNIQUE SILICON (10/BX)

## (undated) DEVICE — BIT DRILL SHORT 4.2MM X 155MM (4TX2=8)

## (undated) DEVICE — CHLORAPREP 26 ML APPLICATOR - ORANGE TINT(25/CA)

## (undated) DEVICE — ELECTRODE DUAL RETURN W/ CORD - (50/PK)

## (undated) DEVICE — SUTURE GENERAL

## (undated) DEVICE — DRAPE STRLE REG TOWEL 18X24 - (10/BX 4BX/CA)"

## (undated) DEVICE — DRAPE C-ARM LARGE 41IN X 74 IN - (10/BX 2BX/CA)

## (undated) DEVICE — TUBING CLEARLINK DUO-VENT - C-FLO (48EA/CA)

## (undated) DEVICE — HEAD HOLDER JUNIOR/ADULT

## (undated) DEVICE — ELECTRODE 850 FOAM ADHESIVE - HYDROGEL RADIOTRNSPRNT (50/PK)

## (undated) DEVICE — GLOVE BIOGEL PI INDICATOR SZ 7.0 SURGICAL PF LF - (50/BX 4BX/CA)

## (undated) DEVICE — DRAPE U ORTHOPEDIC - (10/BX)

## (undated) DEVICE — KIT ANESTHESIA W/CIRCUIT & 3/LT BAG W/FILTER (20EA/CA)

## (undated) DEVICE — CANISTER SUCTION 3000ML MECHANICAL FILTER AUTO SHUTOFF MEDI-VAC NONSTERILE LF DISP  (40EA/CA)

## (undated) DEVICE — SET EXTENSION WITH 2 PORTS (48EA/CA) ***PART #2C8610 IS A SUBSTITUTE*****

## (undated) DEVICE — ROD GUIDE BALL TIP 3.0MM X 1000MM